# Patient Record
Sex: FEMALE | Race: WHITE | NOT HISPANIC OR LATINO | Employment: UNEMPLOYED | URBAN - METROPOLITAN AREA
[De-identification: names, ages, dates, MRNs, and addresses within clinical notes are randomized per-mention and may not be internally consistent; named-entity substitution may affect disease eponyms.]

---
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
## 2022-01-06 DIAGNOSIS — E07.9 THYROID DISORDER: Primary | ICD-10-CM

## 2022-01-07 LAB
T4 FREE SERPL-MCNC: 1.73 NG/DL (ref 0.93–1.6)
TSH SERPL DL<=0.005 MIU/L-ACNC: 0.09 UIU/ML (ref 0.45–4.5)

## 2022-01-12 LAB — THYROPEROXIDASE AB SERPL-ACNC: 31.6 IU/ML

## 2022-03-25 DIAGNOSIS — E07.9 THYROID DISORDER: ICD-10-CM

## 2022-03-25 RX ORDER — LEVOTHYROXINE SODIUM 0.12 MG/1
TABLET ORAL
Qty: 90 TABLET | Refills: 2 | Status: SHIPPED | OUTPATIENT
Start: 2022-03-25

## 2022-10-12 PROBLEM — J01.00 ACUTE NON-RECURRENT MAXILLARY SINUSITIS: Status: RESOLVED | Noted: 2021-07-22 | Resolved: 2022-10-12

## 2022-10-16 DIAGNOSIS — E07.9 THYROID DISORDER: ICD-10-CM

## 2022-10-17 RX ORDER — LEVOTHYROXINE SODIUM 0.12 MG/1
TABLET ORAL
Qty: 90 TABLET | Refills: 2 | Status: SHIPPED | OUTPATIENT
Start: 2022-10-17

## 2023-01-24 LAB
T4 FREE SERPL-MCNC: 1.77 NG/DL (ref 0.82–1.77)
TSH SERPL DL<=0.005 MIU/L-ACNC: 0.72 UIU/ML (ref 0.45–4.5)

## 2023-03-17 ENCOUNTER — OFFICE VISIT (OUTPATIENT)
Dept: INTERNAL MEDICINE CLINIC | Facility: CLINIC | Age: 23
End: 2023-03-17

## 2023-03-17 VITALS
WEIGHT: 152 LBS | HEIGHT: 62 IN | RESPIRATION RATE: 14 BRPM | DIASTOLIC BLOOD PRESSURE: 70 MMHG | HEART RATE: 74 BPM | BODY MASS INDEX: 27.97 KG/M2 | SYSTOLIC BLOOD PRESSURE: 130 MMHG | TEMPERATURE: 97.8 F | OXYGEN SATURATION: 98 %

## 2023-03-17 DIAGNOSIS — E03.9 ACQUIRED HYPOTHYROIDISM: Primary | ICD-10-CM

## 2023-03-17 DIAGNOSIS — Z13.6 SCREENING FOR CARDIOVASCULAR CONDITION: ICD-10-CM

## 2023-03-17 DIAGNOSIS — R10.10 PAIN OF UPPER ABDOMEN: ICD-10-CM

## 2023-03-17 RX ORDER — PANTOPRAZOLE SODIUM 40 MG/1
40 TABLET, DELAYED RELEASE ORAL
Qty: 30 TABLET | Refills: 5 | Status: SHIPPED | OUTPATIENT
Start: 2023-03-17 | End: 2023-09-13

## 2023-03-17 NOTE — PROGRESS NOTES
Assessment/Plan:    No problem-specific Assessment & Plan notes found for this encounter  Diagnoses and all orders for this visit:    Acquired hypothyroidism  -     TSH, 3rd generation; Future  -     T4, free; Future    Pain of upper abdomen  -     Lipase; Future  -     CBC and differential; Future  -     Comprehensive metabolic panel; Future  -     US abdomen complete; Future  -     pantoprazole (PROTONIX) 40 mg tablet; Take 1 tablet (40 mg total) by mouth daily before breakfast  -     H  pylori breath test; Future  -     Pregnancy Test (HCG Qualitative); Future  -     Celiac HLA-DQ,blood; Future    Screening for cardiovascular condition  -     Lipid panel; Future          Subjective:      Patient ID: Mariama Yoo is a 23 y o  female  abd pain 2 mo  B/l abd ache rlq  ruq  1 month  Daily   Eating stomache aches not feel full  All foods-daity and weat not noticed=more carbs  Meds=0  Stress=no more spring break  Bm=1 q 48 day-lifetime  Periods=monthly reg  No diarrhea  Fat in diet=less fat  Finishing jr year-ok  appt  roomates ok  Editinng=on line [employer] house=love books  otc pepcid-no help            The following portions of the patient's history were reviewed and updated as appropriate: allergies, current medications, past family history, past medical history, past social history, past surgical history, and problem list     Review of Systems   Constitutional: Negative for activity change and fatigue  HENT: Negative for ear discharge, ear pain, rhinorrhea and sore throat  Eyes: Negative for pain and visual disturbance  Respiratory: Negative for cough and shortness of breath  Cardiovascular: Negative for chest pain and leg swelling  Gastrointestinal: Positive for abdominal pain  Negative for abdominal distention, constipation and diarrhea  Endocrine: Negative for cold intolerance and polyuria  Genitourinary: Negative for flank pain and hematuria     Musculoskeletal: Negative for back pain and joint swelling  Skin: Negative for pallor and wound  Neurological: Negative for dizziness, seizures and speech difficulty  Psychiatric/Behavioral: Negative for confusion and hallucinations  Objective:      /70   Pulse 74   Temp 97 8 °F (36 6 °C)   Resp 14   Ht 5' 2" (1 575 m)   Wt 68 9 kg (152 lb)   SpO2 98%   BMI 27 80 kg/m²          Physical Exam  Vitals and nursing note reviewed  Constitutional:       General: She is not in acute distress  Appearance: Normal appearance  She is not ill-appearing  HENT:      Head: Normocephalic  Right Ear: External ear normal  There is no impacted cerumen  Left Ear: External ear normal  There is no impacted cerumen  Nose: No congestion or rhinorrhea  Mouth/Throat:      Pharynx: No posterior oropharyngeal erythema  Eyes:      General: No scleral icterus  Right eye: No discharge  Left eye: No discharge  Neck:      Vascular: No carotid bruit  Cardiovascular:      Rate and Rhythm: Normal rate and regular rhythm  Heart sounds: Normal heart sounds  No murmur heard  No friction rub  No gallop  Pulmonary:      Breath sounds: No wheezing or rhonchi  Abdominal:      General: There is no distension  Tenderness: There is no abdominal tenderness  There is no guarding  Musculoskeletal:         General: No swelling  Cervical back: No rigidity  Right lower leg: No edema  Left lower leg: No edema  Lymphadenopathy:      Cervical: No cervical adenopathy  Skin:     Coloration: Skin is not jaundiced  Neurological:      Mental Status: She is alert  Cranial Nerves: No cranial nerve deficit  Motor: No weakness        Coordination: Coordination normal    Psychiatric:         Mood and Affect: Mood normal

## 2023-03-22 LAB
ALBUMIN SERPL-MCNC: 4.5 G/DL (ref 3.9–5)
ALBUMIN/GLOB SERPL: 2 {RATIO} (ref 1.2–2.2)
ALP SERPL-CCNC: 78 IU/L (ref 42–106)
ALT SERPL-CCNC: 12 IU/L (ref 0–32)
ANNOTATION COMMENT IMP: NORMAL
AST SERPL-CCNC: 19 IU/L (ref 0–40)
B-HCG SERPL QL: NEGATIVE MIU/ML
BASOPHILS # BLD AUTO: 0.1 X10E3/UL (ref 0–0.2)
BASOPHILS NFR BLD AUTO: 1 %
BILIRUB SERPL-MCNC: <0.2 MG/DL (ref 0–1.2)
BUN SERPL-MCNC: 10 MG/DL (ref 6–20)
BUN/CREAT SERPL: 14 (ref 9–23)
CALCIUM SERPL-MCNC: 9.1 MG/DL (ref 8.7–10.2)
CHLORIDE SERPL-SCNC: 108 MMOL/L (ref 96–106)
CHOLEST SERPL-MCNC: 153 MG/DL (ref 100–199)
CO2 SERPL-SCNC: 20 MMOL/L (ref 20–29)
CREAT SERPL-MCNC: 0.73 MG/DL (ref 0.57–1)
EGFR: 121 ML/MIN/1.73
EOSINOPHIL # BLD AUTO: 0.5 X10E3/UL (ref 0–0.4)
EOSINOPHIL NFR BLD AUTO: 6 %
ERYTHROCYTE [DISTWIDTH] IN BLOOD BY AUTOMATED COUNT: 12.7 % (ref 11.7–15.4)
GLOBULIN SER-MCNC: 2.2 G/DL (ref 1.5–4.5)
GLUCOSE SERPL-MCNC: 83 MG/DL (ref 70–99)
HCT VFR BLD AUTO: 37.8 % (ref 34–46.6)
HDLC SERPL-MCNC: 65 MG/DL
HGB BLD-MCNC: 12.8 G/DL (ref 11.1–15.9)
HLA-DQ2 QL: NORMAL
HLA-DQ8 QL: NORMAL
IMM GRANULOCYTES # BLD: 0 X10E3/UL (ref 0–0.1)
IMM GRANULOCYTES NFR BLD: 0 %
LDLC SERPL CALC-MCNC: 75 MG/DL (ref 0–99)
LIPASE SERPL-CCNC: 33 U/L (ref 14–72)
LYMPHOCYTES # BLD AUTO: 1.5 X10E3/UL (ref 0.7–3.1)
LYMPHOCYTES NFR BLD AUTO: 17 %
MCH RBC QN AUTO: 29.8 PG (ref 26.6–33)
MCHC RBC AUTO-ENTMCNC: 33.9 G/DL (ref 31.5–35.7)
MCV RBC AUTO: 88 FL (ref 79–97)
MONOCYTES # BLD AUTO: 0.7 X10E3/UL (ref 0.1–0.9)
MONOCYTES NFR BLD AUTO: 8 %
NEUTROPHILS # BLD AUTO: 6 X10E3/UL (ref 1.4–7)
NEUTROPHILS NFR BLD AUTO: 68 %
PLATELET # BLD AUTO: 194 X10E3/UL (ref 150–450)
POTASSIUM SERPL-SCNC: 4.2 MMOL/L (ref 3.5–5.2)
PROT SERPL-MCNC: 6.7 G/DL (ref 6–8.5)
RBC # BLD AUTO: 4.3 X10E6/UL (ref 3.77–5.28)
REF LAB TEST METHOD: NORMAL
SL AMB VLDL CHOLESTEROL CALC: 13 MG/DL (ref 5–40)
SODIUM SERPL-SCNC: 145 MMOL/L (ref 134–144)
T4 FREE SERPL-MCNC: 1.75 NG/DL (ref 0.82–1.77)
TRIGL SERPL-MCNC: 62 MG/DL (ref 0–149)
TSH SERPL DL<=0.005 MIU/L-ACNC: 0.79 UIU/ML (ref 0.45–4.5)
UREA BREATH TEST QL: NEGATIVE
WBC # BLD AUTO: 8.7 X10E3/UL (ref 3.4–10.8)

## 2023-03-23 ENCOUNTER — HOSPITAL ENCOUNTER (OUTPATIENT)
Dept: ULTRASOUND IMAGING | Facility: MEDICAL CENTER | Age: 23
Discharge: HOME | End: 2023-03-23

## 2023-03-23 DIAGNOSIS — R10.10 PAIN OF UPPER ABDOMEN: ICD-10-CM

## 2023-03-31 LAB
ANNOTATION COMMENT IMP: NORMAL
HLA-DQ2 QL: POSITIVE
HLA-DQ8 QL: POSITIVE
REF LAB TEST METHOD: NORMAL

## 2023-04-08 DIAGNOSIS — R10.10 PAIN OF UPPER ABDOMEN: ICD-10-CM

## 2023-04-08 RX ORDER — PANTOPRAZOLE SODIUM 40 MG/1
TABLET, DELAYED RELEASE ORAL
Qty: 90 TABLET | Refills: 2 | Status: SHIPPED | OUTPATIENT
Start: 2023-04-08

## 2023-05-16 PROBLEM — Z13.6 SCREENING FOR CARDIOVASCULAR CONDITION: Status: RESOLVED | Noted: 2021-07-22 | Resolved: 2023-05-16

## 2023-05-19 ENCOUNTER — OFFICE VISIT (OUTPATIENT)
Dept: OBGYN CLINIC | Facility: CLINIC | Age: 23
End: 2023-05-19

## 2023-05-19 VITALS
DIASTOLIC BLOOD PRESSURE: 72 MMHG | SYSTOLIC BLOOD PRESSURE: 108 MMHG | WEIGHT: 152 LBS | HEIGHT: 62 IN | BODY MASS INDEX: 27.97 KG/M2

## 2023-05-19 DIAGNOSIS — Z30.09 CONTRACEPTIVE EDUCATION: ICD-10-CM

## 2023-05-19 DIAGNOSIS — Z01.419 ENCOUNTER FOR GYNECOLOGICAL EXAMINATION WITHOUT ABNORMAL FINDING: Primary | ICD-10-CM

## 2023-05-19 DIAGNOSIS — Z12.4 SCREENING FOR CERVICAL CANCER: ICD-10-CM

## 2023-05-19 DIAGNOSIS — N76.0 BACTERIAL VAGINOSIS: ICD-10-CM

## 2023-05-19 DIAGNOSIS — K90.0 CELIAC DISEASE: ICD-10-CM

## 2023-05-19 DIAGNOSIS — Z11.3 SCREEN FOR STD (SEXUALLY TRANSMITTED DISEASE): ICD-10-CM

## 2023-05-19 DIAGNOSIS — E03.9 HYPOTHYROIDISM, UNSPECIFIED TYPE: ICD-10-CM

## 2023-05-19 DIAGNOSIS — B96.89 BACTERIAL VAGINOSIS: ICD-10-CM

## 2023-05-19 RX ORDER — METRONIDAZOLE 7.5 MG/G
1 GEL VAGINAL DAILY
Qty: 70 G | Refills: 1 | Status: SHIPPED | OUTPATIENT
Start: 2023-05-19 | End: 2023-05-26

## 2023-05-19 NOTE — PATIENT INSTRUCTIONS
Pap every 3 years if normal, STI testing as indicated, exercise most days of week, obtain appropriate diet and hydration, Calcium 1000mg + 600 vit D daily, birth control as directed (ACHES reviewed)  Benefits, risks and alternatives discussed/reviewed  Condom use when sexually active for sexually transmitted infection prevention  HPV 9 vaccine recommended through age 39  Check with your insurance for coverage  If covered, call office to schedule start of vaccine series  Annual mammogram starting at age 36, monthly breast self exam  [facility]   at red light or at least  20 times a day  As applies   Risks and benefits of  NISHA, POP, Nuvaring , Depo  contraceptive  patch and  LARCS  DW pt  Condoms to  prevent  STDs  Pt will call if needs  contraception

## 2023-05-24 LAB
C TRACH RRNA CVX QL NAA+PROBE: NEGATIVE
CYTOLOGIST CVX/VAG CYTO: NORMAL
DX ICD CODE: NORMAL
N GONORRHOEA RRNA CVX QL NAA+PROBE: NEGATIVE
OTHER STN SPEC: NORMAL
OTHER STN SPEC: NORMAL
PATH REPORT.FINAL DX SPEC: NORMAL
SL AMB NOTE:: NORMAL
SL AMB SPECIMEN ADEQUACY: NORMAL
SL AMB TEST METHODOLOGY: NORMAL

## 2023-06-02 NOTE — PROGRESS NOTES
[address], [address], [facility], [address] [place] [address]    ASSESSMENT/PLAN: Jinx Cranker is a 26 y o  Benito Thomas who presents for annual gynecologic exam     Encounter for routine gynecologic examination  - Routine well woman exam completed today  - HPV Vaccination status: Immunization series complete  - STI screening offered including HIV testing: cultures    - Contraceptive counseling discussed  Current contraception: condoms or combination OCPs:     Additional problems addressed during this visit:  1  Encounter for gynecological examination without abnormal finding    2  Screen for STD (sexually transmitted disease)    3  Celiac disease    4  Contraceptive education    5  Hypothyroidism, unspecified type        CC:  Annual Gynecologic Examination    HPI: Jinx Cranker is a 26 y o  Benito Thomas who presents for annual gynecologic examination  25 yo  here for wellness exam    -28  D for 5 d  minimla cramps  Not currently sexually active  + condom use   + hx of BV treated at school  Hx reviewed and updated    The following portions of the patient's history were reviewed and updated as appropriate: She  has a past medical history of Depression (2016), Dermagraphy, and Hypothyroidism ()  She  has a past surgical history that includes Anterior cruciate ligament repair () and [place] tooth extraction ()  Her family history includes Asthma in her brother; Breast cancer in her paternal grandmother; Cancer in her father and paternal grandfather; Heart disease in her paternal grandmother; Migraines in her maternal grandmother; Thyroid disease in her maternal grandmother and mother  She  reports that she has never smoked  She has never used smokeless tobacco  She reports current alcohol use of about 4 0 standard drinks per week  She reports that she does not use drugs    Current Outpatient Medications   Medication Sig Dispense Refill   • levothyroxine 125 mcg tablet TAKE I have reviewed the notes, assessments, and/or procedures performed by Ijeoma LEON  , I concur with her/his documentation of Xena Caicedo.   "1 TABLET BY MOUTH EVERY DAY IN THE EARLY MORNING 90 tablet 2     No current facility-administered medications for this visit  She is allergic to gluten meal - food allergy       Review of Systems   Constitutional: Negative for chills and fever  HENT: Negative for ear pain and sore throat  Eyes: Negative for pain and visual disturbance  Respiratory: Negative for cough and shortness of breath  Cardiovascular: Negative for chest pain and palpitations  Gastrointestinal: Negative for abdominal pain and vomiting  Genitourinary: Negative for dysuria and hematuria  Musculoskeletal: Negative for arthralgias and back pain  Skin: Negative for color change and rash  Neurological: Negative for seizures and syncope  Hematological: Negative  Psychiatric/Behavioral: Negative  All other systems reviewed and are negative  Objective:  /72 (BP Location: Left arm, Patient Position: Sitting, Cuff Size: Standard)   Ht 5' 2\" (1 575 m)   Wt 68 9 kg (152 lb)   LMP 05/10/2023 (Exact Date)   Breastfeeding No   BMI 27 80 kg/m²    Physical Exam  Vitals and nursing note reviewed  Constitutional:       Appearance: Normal appearance  HENT:      Head: Normocephalic  Cardiovascular:      Rate and Rhythm: Normal rate and regular rhythm  Pulses: Normal pulses  Heart sounds: Normal heart sounds  Pulmonary:      Effort: Pulmonary effort is normal       Breath sounds: Normal breath sounds  Chest:      Chest wall: No mass, lacerations, swelling, tenderness or edema  Breasts: Chas Score is 4  Breasts are symmetrical       Right: Normal  No swelling, bleeding, inverted nipple, mass, nipple discharge, skin change or tenderness  Left: No swelling, bleeding, inverted nipple, mass, nipple discharge, skin change or tenderness  Abdominal:      General: Abdomen is flat  Bowel sounds are normal       Palpations: Abdomen is soft  Genitourinary:     General: Normal vulva        " Exam position: Lithotomy position  Pubic Area: No rash  Chas stage (genital): 4       Labia:         Right: No rash, tenderness or lesion  Left: No rash, tenderness or lesion  Urethra: No urethral pain, urethral swelling or urethral lesion  Vagina: Normal       Cervix: No cervical motion tenderness or discharge  Uterus: Normal        Adnexa: Right adnexa normal and left adnexa normal       Rectum: Normal    Musculoskeletal:         General: Normal range of motion  Cervical back: Neck supple  Lymphadenopathy:      Upper Body:      Right upper body: No supraclavicular, axillary or pectoral adenopathy  Left upper body: No supraclavicular, axillary or pectoral adenopathy  Lower Body: No right inguinal adenopathy  No left inguinal adenopathy  Skin:     General: Skin is warm and dry  Neurological:      General: No focal deficit present  Mental Status: She is alert and oriented to person, place, and time  Psychiatric:         Mood and Affect: Mood normal          Behavior: Behavior normal          Thought Content:  Thought content normal          Judgment: Judgment normal

## 2023-07-18 PROBLEM — Z11.3 SCREEN FOR STD (SEXUALLY TRANSMITTED DISEASE): Status: RESOLVED | Noted: 2023-05-19 | Resolved: 2023-07-18

## 2023-10-24 ENCOUNTER — OFFICE VISIT (OUTPATIENT)
Dept: OBGYN CLINIC | Facility: CLINIC | Age: 23
End: 2023-10-24
Payer: COMMERCIAL

## 2023-10-24 VITALS
BODY MASS INDEX: 27.79 KG/M2 | SYSTOLIC BLOOD PRESSURE: 110 MMHG | DIASTOLIC BLOOD PRESSURE: 58 MMHG | HEIGHT: 62 IN | WEIGHT: 151 LBS

## 2023-10-24 DIAGNOSIS — Z30.09 CONTRACEPTIVE EDUCATION: Primary | ICD-10-CM

## 2023-10-24 DIAGNOSIS — Z78.9 USES VAGINAL CONTRACEPTIVE RING: ICD-10-CM

## 2023-10-24 PROCEDURE — 99203 OFFICE O/P NEW LOW 30 MIN: CPT | Performed by: OBSTETRICS & GYNECOLOGY

## 2023-10-24 RX ORDER — ETONOGESTREL AND ETHINYL ESTRADIOL VAGINAL .015; .12 MG/D; MG/D
RING VAGINAL
Qty: 3 EACH | Refills: 4 | Status: SHIPPED | OUTPATIENT
Start: 2023-10-24

## 2023-10-24 NOTE — PROGRESS NOTES
PROBLEM GYNECOLOGICAL VISIT    Shin Cevallos is a 26 y.o. female who presents today with complaint of need for  family planning . Her general medical history has been reviewed and she reports it as follows:    Past Medical History:   Diagnosis Date    Depression 2016    Dermagraphy     Hypothyroidism      Past Surgical History:   Procedure Laterality Date    ANTERIOR CRUCIATE LIGAMENT REPAIR      and 2019    WISDOM TOOTH EXTRACTION  2018     OB History          0    Para   0    Term   0       0    AB   0    Living   0         SAB   0    IAB   0    Ectopic   0    Multiple   0    Live Births   0               Social History     Tobacco Use    Smoking status: Never    Smokeless tobacco: Never   Vaping Use    Vaping Use: Never used   Substance Use Topics    Alcohol use: Yes     Alcohol/week: 4.0 standard drinks of alcohol     Types: 4 Glasses of wine per week     Comment: socially    Drug use: Never       Current Outpatient Medications   Medication Instructions    etonogestrel-ethinyl estradiol (NuvaRing) 0.12-0.015 MG/24HR vaginal ring Insert vaginally and leave in place for 3 consecutive weeks, then remove for 1 week. levothyroxine 125 mcg tablet TAKE 1 TABLET BY MOUTH EVERY DAY IN THE EARLY MORNING       History of Present Illness:   + sexually active. Encouraged condom use.   + hx of  heavier menses . Roommate uses the nuvaring. Review of Systems:  Review of Systems   Constitutional: Negative. Genitourinary:  Negative for difficulty urinating, vaginal bleeding and vaginal discharge. Neurological: Negative. Hematological: Negative. Psychiatric/Behavioral: Negative. Physical Exam:  /58 (BP Location: Left arm, Patient Position: Sitting, Cuff Size: Standard)   Ht 5' 2" (1.575 m)   Wt 68.5 kg (151 lb)   LMP 10/14/2023 (Exact Date)   BMI 27.62 kg/m²   Physical Exam  Constitutional:       Appearance: She is normal weight.    Neurological:      General: No focal deficit present. Mental Status: She is alert and oriented to person, place, and time. Psychiatric:         Mood and Affect: Mood normal.         Behavior: Behavior normal.         Thought Content: Thought content normal.         Judgment: Judgment normal.   Vitals and nursing note reviewed. -urinalysis: na    Assessment:   1. Contraceptive education ,   sti prevention    hx of depression      Plan:   - Through a patient centered approach to contraceptive counseling, we discussed a variety of contraceptive methods including pill, patch, ring, injectable, long-acting reversible methods such as the subdermal contraceptive implant and intrauterine device, and sterilization. We compared the efficacy of various methods using a tiered efficacy chart. We discussed the expected changes on menstrual bleeding and other side effects of various methods. - We assessed for medical conditions that could affect the safety profile of contraception. She does not smoke, denies a history of hypertension or VTE, and denies a history of migraine with aura. - The patient opts to proceed with [facility] place the Sunday after  menses starts  Reviewed ACHES and BTB  Nuvaring  one  per vagina  for 3 weeks in one week out disp  3  4 refills  fu in  May 2024. I have spent a total time of 30 minutes on 10/24/23 in caring for this patient including Diagnostic results, Risks and benefits of tx options, Instructions for management, Patient and family education, Importance of tx compliance, Risk factor reductions, Counseling / Coordination of care, Documenting in the medical record, and Reviewing / ordering tests, medicine, procedures  . Reviewed with patient that [facility], but that they will not necessarily be reviewed by me immediately. Explained that I will review results at my earliest opportunity and contact patient appropriately.

## 2023-11-11 DIAGNOSIS — E07.9 THYROID DISORDER: ICD-10-CM

## 2023-11-11 RX ORDER — LEVOTHYROXINE SODIUM 0.12 MG/1
125 TABLET ORAL EVERY MORNING
Qty: 90 TABLET | Refills: 2 | Status: SHIPPED | OUTPATIENT
Start: 2023-11-11

## 2024-02-21 PROBLEM — Z01.419 ENCOUNTER FOR GYNECOLOGICAL EXAMINATION WITHOUT ABNORMAL FINDING: Status: RESOLVED | Noted: 2023-05-19 | Resolved: 2024-02-21

## 2024-03-12 ENCOUNTER — NURSE TRIAGE (OUTPATIENT)
Age: 24
End: 2024-03-12

## 2024-03-12 NOTE — TELEPHONE ENCOUNTER
"Pt reports vaginal burning after intercourse one week ago. Used Monistat 1 day with no relief.  Went to  on Sunday morning and was told she had a yeast infection. Was prescribed diflucan, took first dose on Sunday. Taking second dose tomorrow. Still has thick white discharge. Denies itching, burning, odor or abdominal pain. Pt aware to call back if second dose of diflucan does not help or if symptoms worsen.    Reason for Disposition   Symptoms of a vaginal yeast infection (i.e., white, thick, cottage-cheese-like, itchy, not bad smelling discharge)    Answer Assessment - Initial Assessment Questions  1. DISCHARGE: \"Describe the discharge.\" (e.g., white, yellow, green, gray, foamy, cottage cheese-like)      Thick white discharge  2. ODOR: \"Is there a bad odor?\"      deneis  3. ONSET: \"When did the discharge begin?\"      One week ago  4. RASH: \"Is there a rash in that area?\" If Yes, ask: \"Describe it.\" (e.g., redness, blisters, sores, bumps)      denies  5. ABDOMINAL PAIN: \"Are you having any abdominal pain?\" If Yes, ask: \"What does it feel like? \" (e.g., crampy, dull, intermittent, constant)       denies  6. ABDOMINAL PAIN SEVERITY: If present, ask: \"How bad is it?\"  (e.g., mild, moderate, severe)   - MILD - doesn't interfere with normal activities    - MODERATE - interferes with normal activities or awakens from sleep    - SEVERE - patient doesn't want to move (R/O peritonitis)       denies  7. CAUSE: \"What do you think is causing the discharge?\" \"Have you had the same problem before? What happened then?\"      yeast  8. OTHER SYMPTOMS: \"Do you have any other symptoms?\" (e.g., fever, itching, vaginal bleeding, pain with urination, injury to genital area, vaginal foreign body)      denies  9. PREGNANCY: \"Is there any chance you are pregnant?\" \"When was your last menstrual period?\"      Denies.    Protocols used: Vaginal Discharge-ADULT-[place]    "

## 2024-03-21 ENCOUNTER — OFFICE VISIT (OUTPATIENT)
Dept: OBGYN CLINIC | Facility: CLINIC | Age: 24
End: 2024-03-21
Payer: COMMERCIAL

## 2024-03-21 ENCOUNTER — NURSE TRIAGE (OUTPATIENT)
Age: 24
End: 2024-03-21

## 2024-03-21 VITALS
WEIGHT: 140 LBS | SYSTOLIC BLOOD PRESSURE: 114 MMHG | BODY MASS INDEX: 25.76 KG/M2 | HEIGHT: 62 IN | DIASTOLIC BLOOD PRESSURE: 70 MMHG

## 2024-03-21 DIAGNOSIS — N76.0 BACTERIAL VAGINOSIS: Primary | ICD-10-CM

## 2024-03-21 DIAGNOSIS — B96.89 BACTERIAL VAGINOSIS: Primary | ICD-10-CM

## 2024-03-21 DIAGNOSIS — N89.8 VAGINAL DISCHARGE: ICD-10-CM

## 2024-03-21 DIAGNOSIS — Z11.3 ROUTINE SCREENING FOR STI (SEXUALLY TRANSMITTED INFECTION): ICD-10-CM

## 2024-03-21 DIAGNOSIS — Z11.3 SCREENING EXAMINATION FOR VENEREAL DISEASE: ICD-10-CM

## 2024-03-21 LAB
CLUE CELLS SPEC QL WET PREP: ABNORMAL
PH SMN: 4.5 [PH]
SL AMB POCT WET MOUNT: ABNORMAL
T VAGINALIS VAG QL WET PREP: ABNORMAL
YEAST VAG QL WET PREP: ABNORMAL

## 2024-03-21 PROCEDURE — 99213 OFFICE O/P EST LOW 20 MIN: CPT | Performed by: PHYSICIAN ASSISTANT

## 2024-03-21 PROCEDURE — 87210 SMEAR WET MOUNT SALINE/INK: CPT | Performed by: PHYSICIAN ASSISTANT

## 2024-03-21 RX ORDER — METRONIDAZOLE 500 MG/1
500 TABLET ORAL EVERY 12 HOURS SCHEDULED
Qty: 14 TABLET | Refills: 0 | Status: SHIPPED | OUTPATIENT
Start: 2024-03-21 | End: 2024-03-28

## 2024-03-21 NOTE — PATIENT INSTRUCTIONS
For prevention: Recommend acidophilus or yogurt daily, avoid irritating soaps or lotions, no tight fitted pants or underwear, avoid bubble baths, do not stay in wet swimsuit.

## 2024-03-21 NOTE — TELEPHONE ENCOUNTER
"Patient had yeast infection symptoms about 2 weeks ago and she tried monistat-1 and also went to the urgent care and they prescribed her fluconazole.  She took 1 tablet and took another tablet 3 days later which was last Wednesday 3/13/24.  She is still having vaginal white and thick discharge and is requesting an appointment.      Scheduled an appointment for today.  Patient voiced appreciation.      Errol texted provider to make aware of patient's arrival.      Reason for Disposition   Symptoms of a yeast infection (i.e., itchy, white discharge, not bad smelling) and not improved > 3 days following CARE ADVICE    Answer Assessment - Initial Assessment Questions  1. SYMPTOM: \"What's the main symptom you're concerned about?\" (e.g., pain, itching, dryness)      Vaginal discharge   2. LOCATION: \"Where is the  discharge located?\" (e.g., inside/outside, left/right)      Inside and outside   3. ONSET: \"When did the  discharge  start?\"      2 weeks ago   4. PAIN: \"Is there any pain?\" If Yes, ask: \"How bad is it?\" (Scale: 1-10; mild, moderate, severe)      Denies   5. ITCHING: \"Is there any itching?\" If Yes, ask: \"How bad is it?\" (Scale: 1-10; mild, moderate, severe)      Mild   6. CAUSE: \"What do you think is causing the discharge?\" \"Have you had the same problem before? What happened then?\"      Yeast infection   7. OTHER SYMPTOMS: \"Do you have any other symptoms?\" (e.g., fever, itching, vaginal bleeding, pain with urination, injury to genital area, vaginal foreign body)      Denies   8. PREGNANCY: \"Is there any chance you are pregnant?\" \"When was your last menstrual period?\"      Denies    Protocols used: Vaginal Symptoms-ADULT-[place]    "

## 2024-03-21 NOTE — ASSESSMENT & PLAN NOTE
Reviewed BV on wet mount.   Will plan to treat with Metronidazole 500mg BID x 7 days.   STD cultures done.   For prevention: Recommend acidophilus or yogurt daily, avoid irritating soaps or lotions, no tight fitted pants or underwear, avoid bubble baths, do not stay in wet swimsuit.  Return to office if symptoms are not improving, worsening, or returning.

## 2024-03-21 NOTE — PROGRESS NOTES
Assessment/Plan:    Bacterial vaginosis  Reviewed BV on wet mount.   Will plan to treat with Metronidazole 500mg BID x 7 days.   STD cultures done.   For prevention: Recommend acidophilus or yogurt daily, avoid irritating soaps or lotions, no tight fitted pants or underwear, avoid bubble baths, do not stay in wet swimsuit.  Return to office if symptoms are not improving, worsening, or returning.          Problem List Items Addressed This Visit          Genitourinary    Bacterial vaginosis     Reviewed BV on wet mount.   Will plan to treat with Metronidazole 500mg BID x 7 days.   STD cultures done.   For prevention: Recommend acidophilus or yogurt daily, avoid irritating soaps or lotions, no tight fitted pants or underwear, avoid bubble baths, do not stay in wet swimsuit.  Return to office if symptoms are not improving, worsening, or returning.          Relevant Medications    metroNIDAZOLE (FLAGYL) 500 mg tablet     Other Visit Diagnoses       Screening examination for venereal disease    -  Primary    Routine screening for STI (sexually transmitted infection)        Relevant Orders    Chlamydia/GC DACIA, Confirmation    Vaginal discharge        Relevant Orders    POCT wet mount (Completed)              Subjective:      Patient ID: Sultana Dolan is a 23 y.o. female.    HPI  24 yo seen for vaginal discharge. A couple of days ago thick and white. Now more liquid. Mild itching. Denies odor.   Recent STD cultures negative. Recently found out boyfriend may have been unfaithful. Would like retesting today.   Denies bowel or bladder issues, pelvic pain. Had fever Thursday 3/7/2024, nothing since.   Urgent care on 3/10/2024, had intercourse on 9th and had a lot of burning. Discharge and itching at that time as well. Was treated with Diflucan 150mg x 2 doses 3 days apart. Chlamydia, gonorrhea, trich negative. BV, Yeast culture negative. Reports had menses 3/14/2024.   The following portions of the patient's history were  reviewed and updated as appropriate: She  has a past medical history of Depression (2016), Dermagraphy, and Hypothyroidism (2015).  She   Patient Active Problem List    Diagnosis Date Noted    Uses vaginal contraceptive ring 10/24/2023    Contraceptive education 05/19/2023    Celiac disease 05/19/2023    Hypothyroidism 05/19/2023    Bacterial vaginosis 05/19/2023    Pain of upper abdomen 03/17/2023    Bipolar 1 disorder (HCC) 07/22/2021    S/P ACL reconstruction 12/17/2019    Constipation, acute 12/15/2017    Depression 12/15/2017    Thyroid disorder 12/31/2016    BMI (body mass index), pediatric, 85% to less than 95% for age 10/20/2016    Myopia of both eyes 08/30/2016    Acquired hypothyroidism 07/17/2016    Enlarged pituitary gland (HCC) 03/28/2014     She  has a past surgical history that includes Anterior cruciate ligament repair (2015) and [place] tooth extraction (2018).  Her family history includes Asthma in her brother; Breast cancer in her paternal grandmother; Cancer in her father and paternal grandfather; Heart disease in her paternal grandmother; Migraines in her maternal grandmother; Thyroid disease in her maternal grandmother and mother.  She  reports that she has never smoked. She has never used smokeless tobacco. She reports current alcohol use of about 4.0 standard drinks of alcohol per week. She reports that she does not use drugs.  Current Outpatient Medications   Medication Sig Dispense Refill    etonogestrel-ethinyl estradiol (NuvaRing) 0.12-0.015 MG/24HR vaginal ring Insert vaginally and leave in place for 3 consecutive weeks, then remove for 1 week. 3 each 4    levothyroxine 125 mcg tablet TAKE 1 TABLET BY MOUTH EVERY DAY IN THE MORNING 90 tablet 2    metroNIDAZOLE (FLAGYL) 500 mg tablet Take 1 tablet (500 mg total) by mouth every 12 (twelve) hours for 7 days 14 tablet 0     No current facility-administered medications for this visit.     She is allergic to gluten meal - food  "allergy..    Review of Systems   Constitutional:  Negative for fatigue, fever and unexpected weight change.   HENT:  Negative for dental problem and sinus pressure.    Eyes:  Negative for visual disturbance.   Respiratory:  Negative for cough, shortness of breath and wheezing.    Cardiovascular:  Negative for chest pain.   Gastrointestinal:  Negative for abdominal pain, blood in stool, constipation, diarrhea, nausea and vomiting.   Endocrine: Negative for polydipsia.   Genitourinary:  Positive for vaginal discharge. Negative for difficulty urinating, dyspareunia, dysuria, frequency, hematuria, pelvic pain and urgency.   Musculoskeletal:  Negative for arthralgias and back pain.   Neurological:  Negative for dizziness, seizures, light-headedness and headaches.   Psychiatric/Behavioral:  Negative for suicidal ideas. The patient is not nervous/anxious.          Objective:      /70 (BP Location: Right arm, Patient Position: Sitting, Cuff Size: Standard)   Ht 5' 2\" (1.575 m)   Wt 63.5 kg (140 lb)   LMP 03/14/2024   BMI 25.61 kg/m²          Physical Exam  Constitutional:       Appearance: Normal appearance. She is well-developed.   Neck:      Thyroid: No thyroid mass or thyromegaly.   Cardiovascular:      Rate and Rhythm: Normal rate and regular rhythm.      Heart sounds: Normal heart sounds. No murmur heard.     No friction rub. No gallop.   Pulmonary:      Effort: Pulmonary effort is normal. No respiratory distress.      Breath sounds: Normal breath sounds. No wheezing or rales.   Abdominal:      General: Bowel sounds are normal. There is no distension.      Palpations: Abdomen is soft. There is no mass.      Tenderness: There is no abdominal tenderness. There is no guarding or rebound.   Genitourinary:     Labia:         Right: No rash, tenderness, lesion or injury.         Left: No rash, tenderness, lesion or injury.       Urethra: No prolapse, urethral pain, urethral swelling or urethral lesion.      Vagina: " No signs of injury. Vaginal discharge (thin white discharge.) present. No erythema, tenderness or bleeding.      Cervix: No cervical motion tenderness, discharge or friability.      Uterus: Not deviated, not enlarged and not tender.       Adnexa:         Right: No mass, tenderness or fullness.          Left: No mass, tenderness or fullness.     Musculoskeletal:      Cervical back: Neck supple.   Lymphadenopathy:      Cervical: No cervical adenopathy.      Upper Body:      Right upper body: No supraclavicular adenopathy.      Left upper body: No supraclavicular adenopathy.   Skin:     General: Skin is warm and dry.      Coloration: Skin is not pale.      Findings: No erythema or rash.   Neurological:      Mental Status: She is alert and oriented to person, place, and time.   Psychiatric:         Behavior: Behavior normal.         Thought Content: Thought content normal.         Judgment: Judgment normal.           Wet mount: ph 4.5. Clue cells throughout. No trichomonads or yeast.

## 2024-03-26 LAB
C TRACH RRNA SPEC QL NAA+PROBE: NEGATIVE
N GONORRHOEA RRNA SPEC QL NAA+PROBE: NEGATIVE

## 2024-06-24 ENCOUNTER — OFFICE VISIT (OUTPATIENT)
Dept: OBGYN CLINIC | Facility: CLINIC | Age: 24
End: 2024-06-24
Payer: COMMERCIAL

## 2024-06-24 ENCOUNTER — NURSE TRIAGE (OUTPATIENT)
Age: 24
End: 2024-06-24

## 2024-06-24 VITALS
BODY MASS INDEX: 26.5 KG/M2 | SYSTOLIC BLOOD PRESSURE: 104 MMHG | HEIGHT: 62 IN | DIASTOLIC BLOOD PRESSURE: 64 MMHG | WEIGHT: 144 LBS

## 2024-06-24 DIAGNOSIS — N76.1 SUBACUTE VAGINITIS: Primary | ICD-10-CM

## 2024-06-24 DIAGNOSIS — Z11.3 SCREEN FOR STD (SEXUALLY TRANSMITTED DISEASE): ICD-10-CM

## 2024-06-24 DIAGNOSIS — Z78.9 USES VAGINAL CONTRACEPTIVE RING: ICD-10-CM

## 2024-06-24 LAB
BV WHIFF TEST VAG QL: ABNORMAL
CLUE CELLS SPEC QL WET PREP: ABNORMAL
PH SMN: 4 [PH]
SL AMB POCT WET MOUNT: ABNORMAL
T VAGINALIS VAG QL WET PREP: ABNORMAL
YEAST VAG QL WET PREP: ABNORMAL

## 2024-06-24 PROCEDURE — 87210 SMEAR WET MOUNT SALINE/INK: CPT | Performed by: OBSTETRICS & GYNECOLOGY

## 2024-06-24 PROCEDURE — 99213 OFFICE O/P EST LOW 20 MIN: CPT | Performed by: OBSTETRICS & GYNECOLOGY

## 2024-06-24 RX ORDER — FLUCONAZOLE 150 MG/1
150 TABLET ORAL DAILY
Qty: 1 TABLET | Refills: 1 | Status: SHIPPED | OUTPATIENT
Start: 2024-06-24 | End: 2024-06-25

## 2024-06-24 NOTE — TELEPHONE ENCOUNTER
"Patient is calling in, she reports starting with vaginal symptoms yesterday. She is having a brown discharge that is foul smelling, itching/irritation and burning. She reports she took at her vaginal ring yesterday and she had intense burning. Appt made for today, unable to determine if BV or yeast      Reason for Disposition   Bad smelling vaginal discharge    Answer Assessment - Initial Assessment Questions  1. DISCHARGE: \"Describe the discharge.\" (e.g., white, yellow, green, gray, foamy, cottage cheese-like)      Brown spotting- on ring  2. ODOR: \"Is there a bad odor?\"      Yes   3. ONSET: \"When did the discharge begin?\"      Yesterday   4. RASH: \"Is there a rash in that area?\" If Yes, ask: \"Describe it.\" (e.g., redness, blisters, sores, bumps)      No   5. ABDOMINAL PAIN: \"Are you having any abdominal pain?\" If Yes, ask: \"What does it feel like? \" (e.g., crampy, dull, intermittent, constant)       Denies   6. ABDOMINAL PAIN SEVERITY: If present, ask: \"How bad is it?\"  (e.g., mild, moderate, severe)   - MILD - doesn't interfere with normal activities    - MODERATE - interferes with normal activities or awakens from sleep    - SEVERE - patient doesn't want to move (R/O peritonitis)       Denies   8. OTHER SYMPTOMS: \"Do you have any other symptoms?\" (e.g., fever, itching, vaginal bleeding, pain with urination, injury to genital area, vaginal foreign body)      Itching and burning- burning when took vaginal ring out yesterday    Protocols used: Vaginal Discharge-ADULT-[place]    "

## 2024-06-24 NOTE — PROGRESS NOTES
PROBLEM GYNECOLOGICAL VISIT    Sultana Dolan is a 24 y.o. female who presents today with complaint of vaginal itching and dc off   Her general medical history has been reviewed and she reports it as follows:    Past Medical History:   Diagnosis Date   • Depression    • Dermagraphy    • Hypothyroidism      Past Surgical History:   Procedure Laterality Date   • ANTERIOR CRUCIATE LIGAMENT REPAIR      and 2019   • WISDOM TOOTH EXTRACTION  2018     OB History        0    Para   0    Term   0       0    AB   0    Living   0       SAB   0    IAB   0    Ectopic   0    Multiple   0    Live Births   0               Social History     Tobacco Use   • Smoking status: Never     Passive exposure: Never   • Smokeless tobacco: Never   Vaping Use   • Vaping status: Never Used   Substance Use Topics   • Alcohol use: Yes     Alcohol/week: 4.0 standard drinks of alcohol     Types: 4 Glasses of wine per week     Comment: socially   • Drug use: Never       Current Outpatient Medications   Medication Instructions   • etonogestrel-ethinyl estradiol (NuvaRing) 0.12-0.015 MG/24HR vaginal ring Insert vaginally and leave in place for 3 consecutive weeks, then remove for 1 week.   • fluconazole (DIFLUCAN) 150 mg, Oral, Daily   • levothyroxine 125 mcg, Oral, Every morning   Wilol do one dose of  Diflucan  150 mg  po.  Open to air  fu prn.  Pt day one of menses      History of Present Illness:   + vaginal itching and  burning for the past  2 d.  +  same partner  Nuvaring  for  family planning .   Removed yesterday.   Nodysuria or frequency.     Review of Systems:  Review of Systems   Constitutional: Negative.    Genitourinary:  Positive for vaginal bleeding and vaginal discharge. Negative for decreased urine volume, difficulty urinating, dyspareunia, dysuria, frequency, genital sores, hematuria, menstrual problem, pelvic pain and urgency.   Musculoskeletal: Negative.    Neurological: Negative.   "  Psychiatric/Behavioral: Negative.         Physical Exam:  /64 (BP Location: Left arm, Patient Position: Sitting, Cuff Size: Standard)   Ht 5' 2\" (1.575 m)   Wt 65.3 kg (144 lb)   LMP 05/27/2024   BMI 26.34 kg/m²   Physical Exam  Constitutional:       Appearance: Normal appearance.   Genitourinary:      Vulva, bladder, rectum and urethral meatus normal.      Right Labia: No rash, tenderness, lesions or skin changes.     Left Labia: No tenderness, lesions, skin changes or rash.     No inguinal adenopathy present in the right side.     Pelvic Chas Score: 4/5.     Vaginal discharge and bleeding present.      No vaginal erythema, tenderness, ulceration, granulation tissue or cuff induration.      No vaginal prolapse present.     No vaginal atrophy present.       Right Adnexa: not tender and no mass present.     Left Adnexa: not tender and no mass present.     Cervix is not parous.      No cervical motion tenderness or friability.      No parametrium nodularity or thickening present.     Uterus is not enlarged, tender or irregular.      No urethral prolapse, tenderness, mass or discharge present.      Pelvic Floor: Levator muscle strength is 5/5.     Pelvic floor neuro is intact.     Pelvic exam was performed with patient in the lithotomy position.   Abdominal:      General: Abdomen is flat.      Palpations: Abdomen is soft.      Hernia: There is no hernia in the left inguinal area or right inguinal area.   Musculoskeletal:         General: Normal range of motion.   Lymphadenopathy:      Lower Body: No right inguinal adenopathy.   Neurological:      Mental Status: She is alert and oriented to person, place, and time.   Skin:     General: Skin is warm and dry.   Psychiatric:         Mood and Affect: Mood normal.         Behavior: Behavior normal.         Thought Content: Thought content normal.         Judgment: Judgment normal.   Vitals reviewed.         Assessment:   1. Vaginitis      Plan:   \"Wet mount " done,  gc chlam done .  Open to air  no under wear to bed at night.   Do one dose of diflucan today condoms     Reviewed with patient [facility], but that they will not necessarily be reviewed by me immediately.  Explained that I will review results at my earliest opportunity and contact patient appropriately.

## 2024-06-24 NOTE — TELEPHONE ENCOUNTER
Regarding: pt has yeast infection or bv  ----- Message from Eileen HESTER sent at 6/24/2024  8:13 AM EDT -----  Patient called and has yeast infection or bv and symptoms started yesterday.  Please call her back at [phone]. Thank you

## 2024-06-27 LAB
C TRACH RRNA SPEC QL NAA+PROBE: NEGATIVE
N GONORRHOEA RRNA SPEC QL NAA+PROBE: NEGATIVE

## 2024-07-15 ENCOUNTER — NURSE TRIAGE (OUTPATIENT)
Age: 24
End: 2024-07-15

## 2024-07-15 NOTE — TELEPHONE ENCOUNTER
"Spoke with patient who reports a few days of vulvar itching, irritation and burning when urinating, but describes it as the external skin burning, but UTI like.  She denies any vaginal discharge or rashes, and reports this feels different than previous yeast infection.  Appointment available for today.  No further questions or concerns at this time.    Reason for Disposition   ALL other vulvar symptoms (Exception: feels like prior yeast infection, or rash < 24 hour duration)    Answer Assessment - Initial Assessment Questions  1. SYMPTOM: \"What's the main symptom you're concerned about?\" (e.g., rash, itching, swelling, dryness)     Vulvar itching, burning, redness  3. ONSET: \"When did this  start?\"      A few days ago  4. PAIN: \"Is there any pain?\" If Yes, ask: \"How bad is it?\" (Scale: 1-10; mild, moderate, severe)    -  MILD (1-3): doesn't interfere with normal activities     -  MODERATE (4-7): interferes with normal activities (e.g., work or school) or awakens from sleep      -  SEVERE (8-10): excruciating pain, unable to do any normal activities      0/10  5. CAUSE: \"What do you think is causing the symptoms?\"      unsure  6. OTHER SYMPTOMS: \"Do you have any other symptoms?\" (e.g., fever, vaginal bleeding, pain with urination)      denies  7. PREGNANCY: \"Is there any chance you are pregnant?\" \"When was your last menstrual period?\"    Chance, but doesn't think so.  6/24/24    Protocols used: Vulvar Symptoms-ADULT-[place]    "

## 2024-07-15 NOTE — PROGRESS NOTES
PROBLEM GYNECOLOGICAL VISIT    Sultana Dolan is a 24 y.o. female who presents today with complaint of itching and burning.  Last seen 2024 .  Her general medical history has been reviewed and she reports it as follows:    Past Medical History:   Diagnosis Date   • Depression    • Dermagraphy    • Hypothyroidism      Past Surgical History:   Procedure Laterality Date   • ANTERIOR CRUCIATE LIGAMENT REPAIR      and    • WISDOM TOOTH EXTRACTION  2018     OB History        0    Para   0    Term   0       0    AB   0    Living   0       SAB   0    IAB   0    Ectopic   0    Multiple   0    Live Births   0               Social History     Tobacco Use   • Smoking status: Never     Passive exposure: Never   • Smokeless tobacco: Never   Vaping Use   • Vaping status: Never Used   Substance Use Topics   • Alcohol use: Yes     Alcohol/week: 4.0 standard drinks of alcohol     Types: 4 Glasses of wine per week     Comment: socially   • Drug use: Never       Current Outpatient Medications   Medication Instructions   • clotrimazole-betamethasone (LOTRISONE) 1-0.05 % cream Topical, 2 times daily   • etonogestrel-ethinyl estradiol (NuvaRing) 0.12-0.015 MG/24HR vaginal ring Insert vaginally and leave in place for 3 consecutive weeks, then remove for 1 week.   • fluconazole (DIFLUCAN) 150 mg, Oral, Daily   • levothyroxine 125 mcg, Oral, Every morning       History of Present Illness:   + vaginal itching     Review of Systems:  Review of Systems   Constitutional:  Negative for chills and fever.   HENT:  Negative for ear pain and sore throat.    Eyes:  Negative for pain and visual disturbance.   Respiratory:  Negative for cough and shortness of breath.    Cardiovascular:  Negative for chest pain and palpitations.   Gastrointestinal:  Negative for abdominal pain and vomiting.   Endocrine: Negative.    Genitourinary:  Negative for dysuria and hematuria.   Musculoskeletal:  Negative for arthralgias and back  "pain.   Skin:  Negative for color change and rash.   Allergic/Immunologic: Negative.    Neurological: Negative.  Negative for seizures and syncope.   Hematological: Negative.    Psychiatric/Behavioral: Negative.     All other systems reviewed and are negative.      Physical Exam:  /72 (BP Location: Left arm, Patient Position: Sitting, Cuff Size: Standard)   Ht 5' 2\" (1.575 m)   Wt 65.3 kg (144 lb)   LMP 06/24/2024   BMI 26.34 kg/m²   Physical Exam  Constitutional:       Appearance: Normal appearance. She is normal weight.   Genitourinary:      Vulva, bladder and rectum normal.      No lesions in the vagina.      Genitourinary Comments: 1. Erythema        Right Labia: No rash, tenderness, lesions or skin changes.     Left Labia: No tenderness, lesions, skin changes or rash.     No labial fusion noted.            No inguinal adenopathy present in the right or left side.     No vaginal discharge, erythema, tenderness or bleeding.      No vaginal prolapse present.     Cervix is not nulliparous or parous.      No cervical motion tenderness, discharge, friability, lesion, polyp, nabothian cyst, eversion or elongation.      No urethral prolapse, tenderness, mass, hypermobility or discharge present.      Pelvic floor neuro is intact.     Pelvic exam was performed with patient in the lithotomy position and normal support.   Cardiovascular:      Rate and Rhythm: Normal rate and regular rhythm.   Pulmonary:      Effort: Pulmonary effort is normal.      Breath sounds: Normal breath sounds.   Abdominal:      General: Abdomen is flat. Bowel sounds are normal. There is no distension.      Palpations: Abdomen is soft. There is no splenomegaly or mass.      Tenderness: There is no abdominal tenderness. There is no right CVA tenderness, guarding or rebound.      Hernia: There is no hernia in the left inguinal area or right inguinal area.   Musculoskeletal:      Cervical back: Normal range of motion and neck supple. "   Lymphadenopathy:      Lower Body: No right inguinal adenopathy. No left inguinal adenopathy.   Neurological:      General: No focal deficit present.      Mental Status: She is alert and oriented to person, place, and time.   Skin:     General: Skin is warm and dry.   Psychiatric:         Mood and Affect: Mood normal.         Behavior: Behavior normal.         Thought Content: Thought content normal.         Judgment: Judgment normal.   Vitals and nursing note reviewed.       Point of Care Testing:   -Wet mount: 4   -KOH mount: +   -Whiff: neg      Assessment:   1. Vulvitis  and vaginitis      Plan:   Open to air no under wear to bed a night  .   Diflucan 150 mg today and repeat  in 3 d .   Lotisone  pea size   bid for  7-10 d  Aquaphor  always applied!   Decrease sugar intake       Reviewed with patient that [facility], but that they will not necessarily be reviewed by me immediately.  Explained that I will review results at my earliest opportunity and contact patient appropriately.

## 2024-07-16 ENCOUNTER — TELEPHONE (OUTPATIENT)
Age: 24
End: 2024-07-16

## 2024-07-16 ENCOUNTER — OFFICE VISIT (OUTPATIENT)
Dept: OBGYN CLINIC | Facility: CLINIC | Age: 24
End: 2024-07-16
Payer: COMMERCIAL

## 2024-07-16 VITALS
BODY MASS INDEX: 26.5 KG/M2 | SYSTOLIC BLOOD PRESSURE: 102 MMHG | HEIGHT: 62 IN | DIASTOLIC BLOOD PRESSURE: 72 MMHG | WEIGHT: 144 LBS

## 2024-07-16 DIAGNOSIS — N76.1 SUBACUTE VAGINITIS: Primary | ICD-10-CM

## 2024-07-16 DIAGNOSIS — E03.9 ACQUIRED HYPOTHYROIDISM: ICD-10-CM

## 2024-07-16 PROCEDURE — 87210 SMEAR WET MOUNT SALINE/INK: CPT | Performed by: OBSTETRICS & GYNECOLOGY

## 2024-07-16 PROCEDURE — 99213 OFFICE O/P EST LOW 20 MIN: CPT | Performed by: OBSTETRICS & GYNECOLOGY

## 2024-07-16 RX ORDER — FLUCONAZOLE 150 MG/1
150 TABLET ORAL DAILY
Qty: 2 TABLET | Refills: 0 | Status: SHIPPED | OUTPATIENT
Start: 2024-07-16 | End: 2024-07-18

## 2024-07-16 RX ORDER — CLOTRIMAZOLE AND BETAMETHASONE DIPROPIONATE 10; .64 MG/G; MG/G
CREAM TOPICAL 2 TIMES DAILY
Qty: 15 G | Refills: 0 | Status: SHIPPED | OUTPATIENT
Start: 2024-07-16

## 2024-07-16 NOTE — TELEPHONE ENCOUNTER
Pt called looking to schedule a TB test this week. Scheduled nurse visit for tomorrow in AM, please put an order in pts chart for TB read, thank you.

## 2024-07-21 PROBLEM — Z11.1 ENCOUNTER FOR PPD TEST: Status: ACTIVE | Noted: 2024-06-24

## 2024-07-21 NOTE — PROGRESS NOTES
Assessment/Plan:    No problem-specific Assessment & Plan notes found for this encounter.       Diagnoses and all orders for this visit:    Encounter for PPD test  -     TB Skin Test          Subjective:      Patient ID: Sultana Dolan is a 24 y.o. female.    ppd        The following portions of the patient's history were reviewed and updated as appropriate: allergies, current medications, past family history, past medical history, past social history, past surgical history, and problem list.    Review of Systems   Constitutional:  Negative for activity change and fatigue.   HENT:  Negative for ear discharge, ear pain, rhinorrhea and sore throat.    Eyes:  Negative for pain and visual disturbance.   Respiratory:  Negative for cough and shortness of breath.    Cardiovascular:  Negative for chest pain and leg swelling.   Gastrointestinal:  Negative for abdominal pain, constipation and diarrhea.   Endocrine: Negative for cold intolerance and polyuria.   Genitourinary:  Negative for flank pain and hematuria.   Musculoskeletal:  Negative for back pain and joint swelling.   Skin:  Negative for pallor and wound.   Neurological:  Negative for dizziness, seizures and speech difficulty.   Psychiatric/Behavioral:  Negative for confusion and hallucinations.          Objective:      LMP 06/24/2024          Physical Exam  Vitals and nursing note reviewed.   Constitutional:       General: She is not in acute distress.     Appearance: Normal appearance. She is not ill-appearing.   HENT:      Head: Normocephalic.      Right Ear: External ear normal. There is no impacted cerumen.      Left Ear: External ear normal. There is no impacted cerumen.      Nose: No congestion or rhinorrhea.      Mouth/Throat:      Pharynx: No posterior oropharyngeal erythema.   Eyes:      General: No scleral icterus.        Right eye: No discharge.         Left eye: No discharge.   Neck:      Vascular: No carotid bruit.   Cardiovascular:      Rate and  Rhythm: Normal rate and regular rhythm.      Heart sounds: Normal heart sounds. No murmur heard.     No friction rub. No gallop.   Pulmonary:      Breath sounds: No wheezing or rhonchi.   Abdominal:      General: There is no distension.      Tenderness: There is no abdominal tenderness. There is no guarding.   Musculoskeletal:         General: No swelling.      Cervical back: No rigidity.      Right lower leg: No edema.      Left lower leg: No edema.   Lymphadenopathy:      Cervical: No cervical adenopathy.   Skin:     Coloration: Skin is not jaundiced.   Neurological:      Mental Status: She is alert.      Cranial Nerves: No cranial nerve deficit.      Motor: No weakness.      Coordination: Coordination normal.   Psychiatric:         Mood and Affect: Mood normal.

## 2024-07-22 ENCOUNTER — OFFICE VISIT (OUTPATIENT)
Dept: INTERNAL MEDICINE CLINIC | Facility: CLINIC | Age: 24
End: 2024-07-22
Payer: COMMERCIAL

## 2024-07-22 DIAGNOSIS — Z11.1 ENCOUNTER FOR PPD TEST: Primary | ICD-10-CM

## 2024-07-22 PROBLEM — R10.10 PAIN OF UPPER ABDOMEN: Status: RESOLVED | Noted: 2023-03-17 | Resolved: 2024-07-22

## 2024-07-22 PROCEDURE — 86580 TB INTRADERMAL TEST: CPT | Performed by: INTERNAL MEDICINE

## 2024-07-24 ENCOUNTER — OFFICE VISIT (OUTPATIENT)
Dept: INTERNAL MEDICINE CLINIC | Facility: CLINIC | Age: 24
End: 2024-07-24

## 2024-07-24 DIAGNOSIS — Z11.1 ENCOUNTER FOR PPD SKIN TEST READING: Primary | ICD-10-CM

## 2024-07-24 LAB
INDURATION: 0 MM
TB SKIN TEST: NEGATIVE

## 2024-07-24 PROCEDURE — 99024 POSTOP FOLLOW-UP VISIT: CPT | Performed by: INTERNAL MEDICINE

## 2024-08-26 ENCOUNTER — ANNUAL EXAM (OUTPATIENT)
Dept: OBGYN CLINIC | Facility: CLINIC | Age: 24
End: 2024-08-26
Payer: COMMERCIAL

## 2024-08-26 VITALS
BODY MASS INDEX: 26.31 KG/M2 | DIASTOLIC BLOOD PRESSURE: 64 MMHG | HEIGHT: 62 IN | WEIGHT: 143 LBS | SYSTOLIC BLOOD PRESSURE: 106 MMHG

## 2024-08-26 DIAGNOSIS — Z11.3 SCREEN FOR STD (SEXUALLY TRANSMITTED DISEASE): ICD-10-CM

## 2024-08-26 DIAGNOSIS — Z01.419 WELL WOMAN EXAM: Primary | ICD-10-CM

## 2024-08-26 PROCEDURE — S0612 ANNUAL GYNECOLOGICAL EXAMINA: HCPCS | Performed by: OBSTETRICS & GYNECOLOGY

## 2024-08-26 NOTE — PROGRESS NOTES
[facility] - [address], [address], [place] [address]    ASSESSMENT/PLAN: Sultana Dolan is a 24 y.o.  who presents for annual gynecologic exam.    Encounter for routine gynecologic examination  - Routine well woman exam completed today.  - Cervical Cancer Screening: Current ASCCP Guidelines reviewed. Last Pap: 2023 . History of abnormal: denies  - HPV Vaccination status: Immunization series complete  - STI screening offered including HIV testing: offered, pt declined  - Contraceptive counseling discussed.  Current contraception: Nuvaring:   - The following were reviewed in today's visit: breast self exam, adequate intake of calcium and vitamin D, exercise, and healthy diet    Additional problems addressed during this visit:  1. Well woman exam  2. Screen for STD (sexually transmitted disease)  -     Chlamydia/GC DACIA, Confirmation      CC:  Annual Gynecologic Examination    HPI: Sultana Dolan is a 24 y.o.  who presents for annual gynecologic examination. She reports a monthly menses, not painful or heavy.     ROS: Negative except as noted in HPI    Patient's last menstrual period was 2024.       She  reports being sexually active and has had partner(s) who are male. She reports using the following methods of birth control/protection: Condom Male and Ring.       The following portions of the patient's history were reviewed and updated as appropriate:   Past Medical History:   Diagnosis Date    Depression 2016    Dermagraphy     Hypothyroidism      Past Surgical History:   Procedure Laterality Date    ANTERIOR CRUCIATE LIGAMENT REPAIR      and 2019    WISDOM TOOTH EXTRACTION  2018     Family History   Problem Relation Age of Onset    Thyroid disease Mother     Cancer Father     Migraines Maternal Grandmother     Thyroid disease Maternal Grandmother     Cancer Paternal Grandfather     Breast cancer Paternal Grandmother     Heart disease Paternal Grandmother   "   Asthma Brother      Social History     Socioeconomic History    Marital status: Single     Spouse name: None    Number of children: None    Years of education: None    Highest education level: None   Occupational History    None   Tobacco Use    Smoking status: Never     Passive exposure: Never    Smokeless tobacco: Never   Vaping Use    Vaping status: Never Used   Substance and Sexual Activity    Alcohol use: Yes     Alcohol/week: 4.0 standard drinks of alcohol     Types: 4 Glasses of wine per week     Comment: socially    Drug use: Never    Sexual activity: Yes     Partners: Male     Birth control/protection: Condom Male, Ring   Other Topics Concern    None   Social History Narrative    None     Social Determinants of Health     Financial Resource Strain: Not on file   Food Insecurity: Not on file   Transportation Needs: Not on file   Physical Activity: Not on file   Stress: Not on file   Social Connections: Not on file   Intimate Partner Violence: Not on file   Housing Stability: Not on file     Outpatient Medications Marked as Taking for the 8/26/24 encounter (Annual Exam) with Huey Weaver V DO   Medication    clotrimazole-betamethasone (LOTRISONE) 1-0.05 % cream    etonogestrel-ethinyl estradiol (NuvaRing) 0.12-0.015 MG/24HR vaginal ring    levothyroxine 125 mcg tablet     Allergies   Allergen Reactions    Gluten Meal - Food Allergy GI Intolerance           Objective:  /64 (BP Location: Left arm, Patient Position: Sitting, Cuff Size: Standard)   Ht 5' 2\" (1.575 m)   Wt 64.9 kg (143 lb)   LMP 07/30/2024   BMI 26.16 kg/m²        Chaperone present? Pt declined    General Appearance: alert and oriented, in no acute distress.   Respiratory: Unlabored breathing.  Abdomen: Soft, non-tender, non-distended, no masses, no rebound or guarding.  Breast Exam: No dimpling, nipple retraction or discharge. No lumps or masses. No axillary or supraclavicular nodes.   Pelvic:   External genitalia: Normal " appearance, no abnormal pigmentation, no lesions or masses. Normal Bartholin's and Inland's.      Urinary system: Urethral meatus normal,       Bladder non-tender.      Vaginal: normal mucosa without prolapse or lesions. Normal-appearing physiologic discharge.      Cervix: Normal-appearing, well-epithelialized, no gross lesions or masses. No cervical motion tenderness.      Adnexa: No adnexal masses or tenderness noted.      Uterus: Normal-sized, regular contour, midline, mobile, no uterine tenderness.  Extremities: Normal range of motion. Warm, well-perfused, non-tender.   Skin: normal, no rash or abnormalities  Neurologic: alert, oriented x3  Psychiatric: Appropriate affect, mood stable, cooperative with exam.        Huey Weaver DO  8/26/2024 1:47 PM

## 2024-09-01 LAB
C TRACH RRNA SPEC QL NAA+PROBE: NEGATIVE
N GONORRHOEA RRNA SPEC QL NAA+PROBE: NEGATIVE

## 2024-09-23 ENCOUNTER — NURSE TRIAGE (OUTPATIENT)
Age: 24
End: 2024-09-23

## 2024-09-23 DIAGNOSIS — N89.8 VAGINA ITCHING: Primary | ICD-10-CM

## 2024-09-23 RX ORDER — FLUCONAZOLE 150 MG/1
150 TABLET ORAL DAILY
Qty: 1 TABLET | Refills: 0 | Status: SHIPPED | OUTPATIENT
Start: 2024-09-23 | End: 2024-09-24

## 2024-09-23 RX ORDER — NYSTATIN AND TRIAMCINOLONE ACETONIDE 100000; 1 [USP'U]/G; MG/G
CREAM TOPICAL 2 TIMES DAILY
Qty: 15 G | Refills: 0 | Status: SHIPPED | OUTPATIENT
Start: 2024-09-23

## 2024-09-23 NOTE — TELEPHONE ENCOUNTER
"Symptoms started Wed 18th night - vaginal discharge and itching. Thursday/Friday /Saturday used 3 days of Monistat.   White thick vaginal discharge slight clumpy no odor, outside vaginal irritation with moderate itching and burning. No fever, No urinary symptoms. Last Monday started on Amoxicillin for cold symptoms. Has had yeast infections in past and usually Monistat works, requested pill and prescription itch cream both she had combo ordered in past.   Per protocol:   \"How many yeast infections have you had in the past 2 years?\" 2   -If 1 or less, prescribe Diflucan 150mg PO. If no improvement after 1 dose treatment, please instruct patient to call back to schedule appointment. (should be seen within 3 days)    Advised patient to monitor symptoms and call back any; if symptoms do not resolve 3 days after treatment, any fever, change in vaginal discharge, bleeding, worsening pain or any other concerns. [facility] on file confirmed.   Patient verbalized understanding and thankful for med order. Made aware will check with provider about prescription cream to use with oral Diflucan.     Reason for Disposition   Symptoms of a vaginal yeast infection (i.e., white, thick, cottage-cheese-like, itchy, not bad smelling discharge)    Answer Assessment - Initial Assessment Questions  1. DISCHARGE: \"Describe the discharge.\" (e.g., white, yellow, green, gray, foamy, cottage cheese-like)      Thick white vaginal   2. ODOR: \"Is there a bad odor?\"      denies  3. ONSET: \"When did the discharge begin?\"      Wed 9/18  4. RASH: \"Is there a rash in that area?\" If Yes, ask: \"Describe it.\" (e.g., redness, blisters, sores, bumps)      denies  5. ABDOMINAL PAIN: \"Are you having any abdominal pain?\" If Yes, ask: \"What does it feel like? \" (e.g., crampy, dull, intermittent, constant)       Burning   6. ABDOMINAL PAIN SEVERITY: If present, ask: \"How bad is it?\"  (e.g., mild, moderate, severe)   - MILD - doesn't interfere with normal activities    " "- MODERATE - interferes with normal activities or awakens from sleep    - SEVERE - patient doesn't want to move (R/O peritonitis)       Burning vaginal discomfort   7. CAUSE: \"What do you think is causing the discharge?\" \"Have you had the same problem before? What happened then?\"      Yeast infection   8. OTHER SYMPTOMS: \"Do you have any other symptoms?\" (e.g., fever, itching, vaginal bleeding, pain with urination, injury to genital area, vaginal foreign body)      Itching and burning discomfort   9. PREGNANCY: \"Is there any chance you are pregnant?\" \"When was your last menstrual period?\"      LMP 9/6 Nuva ring    Protocols used: Vaginal Discharge-ADULT-[place]    "

## 2024-09-23 NOTE — TELEPHONE ENCOUNTER
Outgoing call to patient. No answer. Ok to leave message per communication form. Message left informing medication sent in to pharmacy.

## 2024-09-26 ENCOUNTER — OFFICE VISIT (OUTPATIENT)
Dept: OBGYN CLINIC | Facility: CLINIC | Age: 24
End: 2024-09-26
Payer: COMMERCIAL

## 2024-09-26 VITALS
HEIGHT: 62 IN | WEIGHT: 145.8 LBS | DIASTOLIC BLOOD PRESSURE: 62 MMHG | BODY MASS INDEX: 26.83 KG/M2 | SYSTOLIC BLOOD PRESSURE: 92 MMHG

## 2024-09-26 DIAGNOSIS — E03.8 OTHER SPECIFIED HYPOTHYROIDISM: ICD-10-CM

## 2024-09-26 DIAGNOSIS — N89.8 VAGINAL DISCHARGE: ICD-10-CM

## 2024-09-26 DIAGNOSIS — N76.1 SUBACUTE VAGINITIS: Primary | ICD-10-CM

## 2024-09-26 PROCEDURE — 99213 OFFICE O/P EST LOW 20 MIN: CPT | Performed by: OBSTETRICS & GYNECOLOGY

## 2024-09-26 PROCEDURE — 87210 SMEAR WET MOUNT SALINE/INK: CPT | Performed by: OBSTETRICS & GYNECOLOGY

## 2024-09-26 RX ORDER — FLUCONAZOLE 150 MG/1
150 TABLET ORAL ONCE
Qty: 2 TABLET | Refills: 0 | Status: SHIPPED | OUTPATIENT
Start: 2024-09-26 | End: 2024-09-26

## 2024-09-26 NOTE — PROGRESS NOTES
PROBLEM GYNECOLOGICAL VISIT    Sultana Dolan is a 24 y.o. female who presents today with complaint of itching  Her general medical history has been reviewed and she reports it as follows:    Past Medical History:   Diagnosis Date    Depression 2016    Dermagraphy     Hypothyroidism      Past Surgical History:   Procedure Laterality Date    ANTERIOR CRUCIATE LIGAMENT REPAIR      and 2019    WISDOM TOOTH EXTRACTION  2018     OB History          0    Para   0    Term   0       0    AB   0    Living   0         SAB   0    IAB   0    Ectopic   0    Multiple   0    Live Births   0               Social History     Tobacco Use    Smoking status: Never     Passive exposure: Never    Smokeless tobacco: Never   Vaping Use    Vaping status: Never Used   Substance Use Topics    Alcohol use: Yes     Alcohol/week: 4.0 standard drinks of alcohol     Types: 4 Glasses of wine per week     Comment: socially    Drug use: Never       Current Outpatient Medications   Medication Instructions    clotrimazole-betamethasone (LOTRISONE) 1-0.05 % cream Topical, 2 times daily    etonogestrel-ethinyl estradiol (NuvaRing) 0.12-0.015 MG/24HR vaginal ring Insert vaginally and leave in place for 3 consecutive weeks, then remove for 1 week.    levothyroxine 125 mcg, Oral, Every morning    nystatin-triamcinolone (MYCOLOG-II) cream Topical, 2 times daily, As needed for itching       History of Present Illness:   Pt used  abx last week .   had  a great deal of itching and  could  barely walk.  + used one dose of  diflucan on  Monday. No results. + using lotrisone to the vaginal opening but sore..  Same partner      Review of Systems:  Review of Systems   Constitutional: Negative.    Gastrointestinal: Negative.    Genitourinary:  Positive for vaginal discharge and vaginal pain. Negative for decreased urine volume, difficulty urinating, dyspareunia, dysuria, frequency, menstrual problem, pelvic pain, urgency and vaginal  bleeding.   Skin: Negative.    Allergic/Immunologic: Negative.    Neurological: Negative.    Hematological: Negative.    Psychiatric/Behavioral: Negative.       Physical Exam:  There were no vitals taken for this visit.  Physical Exam  Genitourinary:      Vulva, bladder, rectum and urethral meatus normal.      No lesions in the vagina.      Right Labia: No rash, tenderness, lesions, skin changes or Bartholin's cyst.     Left Labia: No tenderness, lesions, skin changes, Bartholin's cyst or rash.     No inguinal adenopathy present in the right or left side.     Pelvic Chas Score: 4/5.     Vaginal discharge and erythema present.      No vaginal tenderness, bleeding, ulceration, mesh exposure, granulation tissue or cuff induration.      Mild vaginal atrophy present.       Right Adnexa: not tender, not full and no mass present.     Left Adnexa: not tender and no mass present.     No cervical motion tenderness or friability.      Uterus is not enlarged, tender or irregular.      No urethral prolapse or tenderness present.      Pelvic Floor: Levator muscle strength is 4/5.     Pelvic floor neuro is intact.     Pelvic exam was performed with patient in the lithotomy position.   Abdominal:      General: Abdomen is flat.      Palpations: Abdomen is soft.      Hernia: There is no hernia in the left inguinal area or right inguinal area.   Musculoskeletal:         General: Normal range of motion.   Lymphadenopathy:      Lower Body: No right inguinal adenopathy. No left inguinal adenopathy.   Skin:     General: Skin is warm and dry.   Psychiatric:         Mood and Affect: Mood normal.         Behavior: Behavior normal.         Thought Content: Thought content normal.         Judgment: Judgment normal.   Vitals and nursing note reviewed.     Point of Care Testing:   Ph 4 neg whiff no clue  + budding        Assessment:   1.  Vaginitis       Plan:   Wet mount done , vaginitis   nuswab + sent,  diflucan 150 mg today and repeat in 3  days. Stop  steroid ointment and use  Aquaphor.  Fu after results are in.     Reviewed with patient that [facility], but that they will not necessarily be reviewed by me immediately.  Explained that I will review results at my earliest opportunity and contact patient appropriately.

## 2024-09-29 LAB
A VAGINAE DNA VAG QL NAA+PROBE: NORMAL SCORE
BVAB2 DNA VAG QL NAA+PROBE: NORMAL SCORE
C ALBICANS DNA VAG QL NAA+PROBE: NEGATIVE
C GLABRATA DNA VAG QL NAA+PROBE: NEGATIVE
C TRACH RRNA SPEC QL NAA+PROBE: NEGATIVE
MEGA1 DNA VAG QL NAA+PROBE: NORMAL SCORE
N GONORRHOEA RRNA SPEC QL NAA+PROBE: NEGATIVE
T VAGINALIS RRNA SPEC QL NAA+PROBE: NEGATIVE

## 2024-10-11 ENCOUNTER — NURSE TRIAGE (OUTPATIENT)
Age: 24
End: 2024-10-11

## 2024-10-11 NOTE — TELEPHONE ENCOUNTER
"5w0d OB patient called in advising she is having some burning during intercourse. This started about 1 week ago after she was treated for a yeast infection. Reports some urinary frequency however no other urinary symptoms. States the burning is only during IC and shortly after but then resolves. None at present. Scheduled patient for evaluation on Monday. Discussed tylenol PRN for pain and pelvic rest. No further questions.       Reason for Disposition   Pain with sexual intercourse (dyspareunia)  (Exception: feels like prior yeast infection, minor abrasion, minor rash < 24 hour duration, mild itching)    Answer Assessment - Initial Assessment Questions  1. SYMPTOM: \"What's the main symptom you're concerned about?\" (e.g., pain, itching, dryness)      Burning during IC  2. LOCATION: \"Where is the  burning located?\" (e.g., inside/outside, left/right)      internal  3. ONSET: \"When did the  burning  start?\"      1 week ago  4. PAIN: \"Is there any pain?\" If Yes, ask: \"How bad is it?\" (Scale: 1-10; mild, moderate, severe)      Burning with intercourse, none otherwise  5. ITCHING: \"Is there any itching?\" If Yes, ask: \"How bad is it?\" (Scale: 1-10; mild, moderate, severe)      No  6. CAUSE: \"What do you think is causing the discharge?\" \"Have you had the same problem before? What happened then?\"      N/A   7. OTHER SYMPTOMS: \"Do you have any other symptoms?\" (e.g., fever, itching, vaginal bleeding, pain with urination, injury to genital area, vaginal foreign body)      N/A  8. PREGNANCY: \"Is there any chance you are pregnant?\" \"When was your last menstrual period?\"      09/06/2024- yes pregnant    Protocols used: Vaginal Symptoms-ADULT-[place]    "

## 2024-10-14 ENCOUNTER — OFFICE VISIT (OUTPATIENT)
Dept: OBGYN CLINIC | Facility: CLINIC | Age: 24
End: 2024-10-14
Payer: COMMERCIAL

## 2024-10-14 VITALS
DIASTOLIC BLOOD PRESSURE: 60 MMHG | SYSTOLIC BLOOD PRESSURE: 100 MMHG | BODY MASS INDEX: 27.05 KG/M2 | HEIGHT: 62 IN | WEIGHT: 147 LBS

## 2024-10-14 DIAGNOSIS — R20.8 BURNING SENSATION: ICD-10-CM

## 2024-10-14 DIAGNOSIS — K90.0 CELIAC DISEASE: ICD-10-CM

## 2024-10-14 DIAGNOSIS — E03.8 OTHER SPECIFIED HYPOTHYROIDISM: ICD-10-CM

## 2024-10-14 DIAGNOSIS — E03.9 ACQUIRED HYPOTHYROIDISM: ICD-10-CM

## 2024-10-14 DIAGNOSIS — N92.6 LATE MENSES: Primary | ICD-10-CM

## 2024-10-14 LAB
SL AMB  POCT GLUCOSE, UA: NORMAL
SL AMB LEUKOCYTE ESTERASE,UA: NORMAL
SL AMB POCT BILIRUBIN,UA: NORMAL
SL AMB POCT BLOOD,UA: NORMAL
SL AMB POCT KETONES,UA: NORMAL
SL AMB POCT NITRITE,UA: NORMAL
SL AMB POCT PH,UA: 5
SL AMB POCT SPECIFIC GRAVITY,UA: 1.01
SL AMB POCT URINE HCG: POSITIVE
SL AMB POCT URINE PROTEIN: NORMAL
SL AMB POCT UROBILINOGEN: 0.2

## 2024-10-14 PROCEDURE — 81002 URINALYSIS NONAUTO W/O SCOPE: CPT | Performed by: OBSTETRICS & GYNECOLOGY

## 2024-10-14 PROCEDURE — 81025 URINE PREGNANCY TEST: CPT | Performed by: OBSTETRICS & GYNECOLOGY

## 2024-10-14 PROCEDURE — 99213 OFFICE O/P EST LOW 20 MIN: CPT | Performed by: OBSTETRICS & GYNECOLOGY

## 2024-10-14 NOTE — PROGRESS NOTES
PROBLEM GYNECOLOGICAL VISIT    Sultana Dolan is a 24 y.o. female who presents today with complaint of burning vaginal  with relations.  Her general medical history has been reviewed and she reports it as follows:    Past Medical History:   Diagnosis Date    Depression 2016    Dermagraphy     Hypothyroidism      Past Surgical History:   Procedure Laterality Date    ANTERIOR CRUCIATE LIGAMENT REPAIR      and 2019    WISDOM TOOTH EXTRACTION  2018     OB History          0    Para   0    Term   0       0    AB   0    Living   0         SAB   0    IAB   0    Ectopic   0    Multiple   0    Live Births   0               Social History     Tobacco Use    Smoking status: Never     Passive exposure: Never    Smokeless tobacco: Never   Vaping Use    Vaping status: Never Used   Substance Use Topics    Alcohol use: Yes     Alcohol/week: 4.0 standard drinks of alcohol     Types: 4 Glasses of wine per week     Comment: socially    Drug use: Never       Current Outpatient Medications   Medication Instructions    clotrimazole-betamethasone (LOTRISONE) 1-0.05 % cream Topical, 2 times daily    etonogestrel-ethinyl estradiol (NuvaRing) 0.12-0.015 MG/24HR vaginal ring Insert vaginally and leave in place for 3 consecutive weeks, then remove for 1 week.    levothyroxine 125 mcg, Oral, Every morning    nystatin-triamcinolone (MYCOLOG-II) cream Topical, 2 times daily, As needed for itching       History of Present Illness:   Pt treated  for  vaginitis one week ago.  Same partner   Neg one swab.  Lmp 9/6 .  Waited  to have intercourse for 8 d  and  felt burning.  No dysuria or frequency ,  No fevers or chills.  + HPT  10/6 boyfriend  excited.   No bleeding      Review of Systems:  Review of Systems   Constitutional: Negative.    Gastrointestinal: Negative.    Genitourinary:  Positive for dyspareunia and menstrual problem. Negative for decreased urine volume, difficulty urinating, vaginal bleeding, vaginal discharge  "and vaginal pain.   Skin: Negative.    Hematological: Negative.    Psychiatric/Behavioral: Negative.       Physical Exam:  /60 (BP Location: Left arm, Patient Position: Sitting, Cuff Size: Standard)   Ht 5' 2\" (1.575 m)   Wt 66.7 kg (147 lb)   LMP 09/06/2024 (Exact Date)   BMI 26.89 kg/m²   Physical Exam  Genitourinary:      Vulva, bladder, rectum and urethral meatus normal.      Genitourinary Comments: Uterus  6 week size      Right Labia: No rash, tenderness, lesions or skin changes.     Left Labia: No tenderness, lesions, skin changes or rash.     No inguinal adenopathy present in the right or left side.     Pelvic Chas Score: 4/5.     No vaginal discharge, erythema, bleeding, ulceration or granulation tissue.      No vaginal prolapse present.     No vaginal atrophy present.       Right Adnexa: not tender, not full and no mass present.     Left Adnexa: not tender, not full and no mass present.     Cervix is nulliparous.      No cervical friability or lesion.      Uterus is enlarged.      Urethral hypermobility present.      No urethral prolapse, tenderness, mass or discharge present.      Pelvic Floor: Levator muscle strength is 4/5.     Pelvic floor neuro is intact.     Pelvic exam was performed with patient in the lithotomy position and normal support.   Abdominal:      Palpations: Abdomen is soft.      Hernia: There is no hernia in the left inguinal area or right inguinal area.   Musculoskeletal:         General: Normal range of motion.   Lymphadenopathy:      Lower Body: No right inguinal adenopathy. No left inguinal adenopathy.   Neurological:      Mental Status: She is oriented to person, place, and time.   Skin:     General: Skin is warm and dry.   Psychiatric:         Mood and Affect: Mood normal.         Behavior: Behavior normal.         Thought Content: Thought content normal.         Judgment: Judgment normal.   Vitals and nursing note reviewed.         Assessment:   1.  + pregnancy test  " lmp 9/6  no bleeding   dysparuina     Plan:   Report bleeding, not keeping anything down for 24 hours,    vaginal rest until dating Ultrasound  No smoking or alcohol intake. Start prenatal vitamin  one po every day .  Negative urinr dip in office today.  Pt denies any genetic disorders in either family .     Reviewed with patient that test results are available in [facility] immediately, but that they will not necessarily be reviewed by me immediately.  Explained that I will review results at my earliest opportunity and contact patient appropriately.

## 2024-10-20 DIAGNOSIS — E07.9 THYROID DISORDER: ICD-10-CM

## 2024-10-21 RX ORDER — LEVOTHYROXINE SODIUM 125 UG/1
125 TABLET ORAL EVERY MORNING
Qty: 30 TABLET | Refills: 0 | Status: SHIPPED | OUTPATIENT
Start: 2024-10-21

## 2024-11-05 ENCOUNTER — ULTRASOUND (OUTPATIENT)
Dept: OBGYN CLINIC | Facility: CLINIC | Age: 24
End: 2024-11-05
Payer: COMMERCIAL

## 2024-11-05 VITALS
WEIGHT: 147 LBS | DIASTOLIC BLOOD PRESSURE: 60 MMHG | BODY MASS INDEX: 27.05 KG/M2 | SYSTOLIC BLOOD PRESSURE: 98 MMHG | HEIGHT: 62 IN

## 2024-11-05 DIAGNOSIS — N91.2 AMENORRHEA: Primary | ICD-10-CM

## 2024-11-05 DIAGNOSIS — Z3A.08 8 WEEKS GESTATION OF PREGNANCY: ICD-10-CM

## 2024-11-05 PROCEDURE — 99213 OFFICE O/P EST LOW 20 MIN: CPT | Performed by: OBSTETRICS & GYNECOLOGY

## 2024-11-05 PROCEDURE — 76817 TRANSVAGINAL US OBSTETRIC: CPT | Performed by: OBSTETRICS & GYNECOLOGY

## 2024-11-05 RX ORDER — LANOLIN ALCOHOL/MO/W.PET/CERES
400 CREAM (GRAM) TOPICAL DAILY
COMMUNITY

## 2024-11-05 NOTE — PROGRESS NOTES
"Routine Prenatal Visit  [facility] - [address], [address], [place] [address]      Assessment/Plan:  24 y.o.  presenting with missed menses.  Viable pregnancy 8w4d by LMP consistent with ultrasound today.  - Continue/start prenatal vitamin  - We reviewed her current medications and discussed which are safe to continue in pregnancy  - We briefly discussed options for aneuploidy screening, to be discussed further at the prenatal intake  - Schedule prenatal intake with RN and initial prenatal visit; prenatal labs will be ordered during the prenatal intake      Subjective:    CC: Missed period    Sultana Dolan is a 24 y.o.  who presents with missed menses.  Patient's last menstrual period was 2024 (exact date).    Patient notes that this pregnancy was unplanned and desired.  She was using contraception (Nuvaring) at the time of conception but may not have been using as recommended. She reports she is certain of her LMP and that she has regular menses. She has has no vaginal bleeding since her LMP.    Objective:  BP 98/60 (BP Location: Right arm, Patient Position: Sitting, Cuff Size: Standard)   Ht 5' 2\" (1.575 m)   Wt 66.7 kg (147 lb)   LMP 2024 (Exact Date)   BMI 26.89 kg/m²     Physical Exam:  General: Well appearing, no distress  CV: Regular rate  Respiratory: Unlabored breathing  Abdomen: Soft, nontender  Extremities: Without edema  Mood and Affect: Appropriate    Transvaginal Pelvic Ultrasound  Garcia IUP  Yolk sac: Present  Fetal Pole: Present  CRL consistent with EGA 8w6d by U/S (8w 4d by LMP)  Cardiac activity: Present   bpm  No adnexal masses appreciated    "

## 2024-11-05 NOTE — PROGRESS NOTES
Ultrasound Probe Disinfection    A transvaginal ultrasound was performed.   Prior to use, disinfection was performed with High Level Disinfection Process (Trophon)  Probe serial number [id number]:  [id number] was used    Luana Patel MA  11/05/24  4:45 PM

## 2024-11-06 ENCOUNTER — TELEPHONE (OUTPATIENT)
Age: 24
End: 2024-11-06

## 2024-11-07 ENCOUNTER — TELEPHONE (OUTPATIENT)
Age: 24
End: 2024-11-07

## 2024-11-11 PROBLEM — Z3A.09 9 WEEKS GESTATION OF PREGNANCY: Status: ACTIVE | Noted: 2024-11-11

## 2024-11-11 PROBLEM — Z11.59 NEED FOR HEPATITIS C SCREENING TEST: Status: ACTIVE | Noted: 2024-11-11

## 2024-11-11 PROBLEM — Z11.4 SCREENING FOR HIV (HUMAN IMMUNODEFICIENCY VIRUS): Status: ACTIVE | Noted: 2024-06-24

## 2024-11-11 PROBLEM — Z13.1 SCREENING FOR DIABETES MELLITUS: Status: ACTIVE | Noted: 2024-06-24

## 2024-11-11 PROBLEM — Z00.00 ANNUAL PHYSICAL EXAM: Status: ACTIVE | Noted: 2024-06-24

## 2024-11-11 NOTE — PATIENT INSTRUCTIONS
"Patient Education     Routine physical for adults   The Basics   Written by the doctors and editors at [facility]   What is a physical? -- A physical is a routine visit, or \"check-up,\" with your doctor. You might also hear it called a \"wellness visit\" or \"preventive visit.\"  During each visit, the doctor will:   Ask about your physical and mental health   Ask about your habits, behaviors, and lifestyle   Do an exam   Give you vaccines if needed   Talk to you about any medicines you take   Give advice about your health   Answer your questions  Getting regular check-ups is an important part of taking care of your health. It can help your doctor find and treat any problems you have. But it's also important for preventing health problems.  A routine physical is different from a \"sick visit.\" A sick visit is when you see a doctor because of a health concern or problem. Since physicals are scheduled ahead of time, you can think about what you want to ask the doctor.  How often should I get a physical? -- It depends on your age and health. In general, for people age 21 years and older:   If you are younger than 50 years, you might be able to get a physical every 3 years.   If you are 50 years or older, your doctor might recommend a physical every year.  If you have an ongoing health condition, like diabetes or high blood pressure, your doctor will probably want to see you more often.  What happens during a physical? -- In general, each visit will include:   Physical exam - The doctor or nurse will check your height, weight, heart rate, and blood pressure. They will also look at your eyes and ears. They will ask about how you are feeling and whether you have any symptoms that bother you.   Medicines - It's a good idea to bring a list of all the medicines you take to each doctor visit. Your doctor will talk to you about your medicines and answer any questions. Tell them if you are having any side effects that bother you. You " "should also tell them if you are having trouble paying for any of your medicines.   Habits and behaviors - This includes:   Your diet   Your exercise habits   Whether you smoke, drink alcohol, or use drugs   Whether you are sexually active   Whether you feel safe at home  Your doctor will talk to you about things you can do to improve your health and lower your risk of health problems. They will also offer help and support. For example, if you want to quit smoking, they can give you advice and might prescribe medicines. If you want to improve your diet or get more physical activity, they can help you with this, too.   Lab tests, if needed - The tests you get will depend on your age and situation. For example, your doctor might want to check your:   Cholesterol   Blood sugar   Iron level   Vaccines - The recommended vaccines will depend on your age, health, and what vaccines you already had. Vaccines are very important because they can prevent certain serious or deadly infections.   Discussion of screening - \"Screening\" means checking for diseases or other health problems before they cause symptoms. Your doctor can recommend screening based on your age, risk, and preferences. This might include tests to check for:   Cancer, such as breast, prostate, cervical, ovarian, colorectal, prostate, lung, or skin cancer   Sexually transmitted infections, such as chlamydia and gonorrhea   Mental health conditions like depression and anxiety  Your doctor will talk to you about the different types of screening tests. They can help you decide which screenings to have. They can also explain what the results might mean.   Answering questions - The physical is a good time to ask the doctor or nurse questions about your health. If needed, they can refer you to other doctors or specialists, too.  Adults older than 65 years often need other care, too. As you get older, your doctor will talk to you about:   How to prevent falling at " home   Hearing or vision tests   Memory testing   How to take your medicines safely   Making sure that you have the help and support you need at home  All topics are updated as new evidence becomes available and our peer review process is complete.  This topic retrieved from [employer] on: May 02, 2024.  Topic 353755 Version 1.0  Release: 32.4.3 - C32.122  © 2024 UpToDate, Inc. and/or its affiliates. All rights reserved.  Consumer Information Use and Disclaimer   Disclaimer: This generalized information is a limited summary of diagnosis, treatment, and/or medication information. It is not meant to be comprehensive and should be used as a tool to help the user understand and/or assess potential diagnostic and treatment options. It does NOT include all information about conditions, treatments, medications, side effects, or risks that may apply to a specific patient. It is not intended to be medical advice or a substitute for the medical advice, diagnosis, or treatment of a health care provider based on the health care provider's examination and assessment of a patient's specific and unique circumstances. Patients must speak with a health care provider for complete information about their health, medical questions, and treatment options, including any risks or benefits regarding use of medications. This information does not endorse any treatments or medications as safe, effective, or approved for treating a specific patient. UpToDate, Inc. and its affiliates disclaim any warranty or liability relating to this information or the use thereof.The use of this information is governed by the Terms of Use, available at https://www.woltersEnviroMissionuwer.com/en/know/clinical-effectiveness-terms. 2024© UpToDate, Inc. and its affiliates and/or licensors. All rights reserved.  Copyright   © 2024 UpToDate, Inc. and/or its affiliates. All rights reserved.

## 2024-11-11 NOTE — PROGRESS NOTES
"[facility]  form  Glasses        Adult Annual Physical  Name: Sultana Dolan      : 2000      MRN: [id number]  Encounter Provider: William Lerma DO  Encounter Date: 2024   Encounter department: [facility] INTERNAL MEDICINE    Assessment & Plan  9 weeks gestation of pregnancy         Celiac disease         Other specified hypothyroidism    Orders:    TSH, 3rd generation; Future    T4, free; Future    TSH, 3rd generation    T4, free    Need for hepatitis C screening test    Orders:    Hepatitis C Antibody; Future    Hepatitis C Antibody    Screening for HIV (human immunodeficiency virus)    Orders:    HIV 1/2 AG/AB w Reflex [facility] for 2 yr old and above; Future    Annual physical exam    Orders:    CBC (Includes Diff/Plt) (Refl); Future    Screening for diabetes mellitus         Screening for cardiovascular condition    Orders:    Comprehensive metabolic panel; Future    Comprehensive metabolic panel    Encounter for PPD test    Orders:    QuantiFERON-TB Gold Plus [facility]; Future    QuantiFERON-TB Gold Plus [facility]    Bipolar 1 disorder (HCC)         Enlarged pituitary gland (HCC)         Immunizations and preventive care screenings were discussed with patient today. Appropriate education was printed on patient's after visit summary.    Counseling:  Exercise: the importance of regular exercise/physical activity was discussed. Recommend exercise 3-5 times per week for at least 30 minutes.          History of Present Illness     Adult Annual Physical  Review of Systems      Objective     /60   Pulse 78   Temp 97.8 °F (36.6 °C)   Resp 12   Ht 5' 2\" (1.575 m)   Wt 66.7 kg (147 lb)   LMP 2024 (Exact Date)   SpO2 97%   BMI 26.89 kg/m²     Physical Exam    "

## 2024-11-12 ENCOUNTER — OFFICE VISIT (OUTPATIENT)
Dept: INTERNAL MEDICINE CLINIC | Facility: CLINIC | Age: 24
End: 2024-11-12
Payer: COMMERCIAL

## 2024-11-12 VITALS
HEIGHT: 62 IN | BODY MASS INDEX: 27.05 KG/M2 | SYSTOLIC BLOOD PRESSURE: 100 MMHG | WEIGHT: 147 LBS | RESPIRATION RATE: 12 BRPM | OXYGEN SATURATION: 97 % | DIASTOLIC BLOOD PRESSURE: 60 MMHG | TEMPERATURE: 97.8 F | HEART RATE: 78 BPM

## 2024-11-12 DIAGNOSIS — E23.6 ENLARGED PITUITARY GLAND (HCC): ICD-10-CM

## 2024-11-12 DIAGNOSIS — Z13.6 SCREENING FOR CARDIOVASCULAR CONDITION: ICD-10-CM

## 2024-11-12 DIAGNOSIS — Z11.59 NEED FOR HEPATITIS C SCREENING TEST: ICD-10-CM

## 2024-11-12 DIAGNOSIS — Z3A.09 9 WEEKS GESTATION OF PREGNANCY: ICD-10-CM

## 2024-11-12 DIAGNOSIS — Z11.1 ENCOUNTER FOR PPD TEST: ICD-10-CM

## 2024-11-12 DIAGNOSIS — Z11.4 SCREENING FOR HIV (HUMAN IMMUNODEFICIENCY VIRUS): ICD-10-CM

## 2024-11-12 DIAGNOSIS — Z13.1 SCREENING FOR DIABETES MELLITUS: ICD-10-CM

## 2024-11-12 DIAGNOSIS — F31.9 BIPOLAR 1 DISORDER (HCC): ICD-10-CM

## 2024-11-12 DIAGNOSIS — K90.0 CELIAC DISEASE: ICD-10-CM

## 2024-11-12 DIAGNOSIS — E03.8 OTHER SPECIFIED HYPOTHYROIDISM: ICD-10-CM

## 2024-11-12 DIAGNOSIS — Z00.00 ANNUAL PHYSICAL EXAM: Primary | ICD-10-CM

## 2024-11-12 PROCEDURE — 99395 PREV VISIT EST AGE 18-39: CPT | Performed by: INTERNAL MEDICINE

## 2024-11-20 DIAGNOSIS — E07.9 THYROID DISORDER: ICD-10-CM

## 2024-11-20 RX ORDER — LEVOTHYROXINE SODIUM 125 UG/1
125 TABLET ORAL EVERY MORNING
Qty: 90 TABLET | Refills: 1 | Status: SHIPPED | OUTPATIENT
Start: 2024-11-20 | End: 2024-11-24

## 2024-11-23 LAB
ALBUMIN SERPL-MCNC: 4.1 G/DL (ref 4–5)
ALP SERPL-CCNC: 63 IU/L (ref 44–121)
ALT SERPL-CCNC: 12 IU/L (ref 0–32)
AST SERPL-CCNC: 17 IU/L (ref 0–40)
BASOPHILS # BLD AUTO: 0 X10E3/UL (ref 0–0.2)
BASOPHILS NFR BLD AUTO: 0 %
BILIRUB SERPL-MCNC: 0.3 MG/DL (ref 0–1.2)
BUN SERPL-MCNC: 12 MG/DL (ref 6–20)
BUN/CREAT SERPL: 17 (ref 9–23)
CALCIUM SERPL-MCNC: 9.3 MG/DL (ref 8.7–10.2)
CHLORIDE SERPL-SCNC: 102 MMOL/L (ref 96–106)
CO2 SERPL-SCNC: 21 MMOL/L (ref 20–29)
CREAT SERPL-MCNC: 0.69 MG/DL (ref 0.57–1)
EGFR: 126 ML/MIN/1.73
EOSINOPHIL # BLD AUTO: 0.1 X10E3/UL (ref 0–0.4)
EOSINOPHIL NFR BLD AUTO: 1 %
ERYTHROCYTE [DISTWIDTH] IN BLOOD BY AUTOMATED COUNT: 13.1 % (ref 11.7–15.4)
GLOBULIN SER-MCNC: 2.2 G/DL (ref 1.5–4.5)
GLUCOSE SERPL-MCNC: 53 MG/DL (ref 70–99)
HCT VFR BLD AUTO: 37.9 % (ref 34–46.6)
HCV AB S/CO SERPL IA: NON REACTIVE
HGB BLD-MCNC: 12.2 G/DL (ref 11.1–15.9)
HIV 1+2 AB+HIV1 P24 AG SERPL QL IA: NON REACTIVE
IMM GRANULOCYTES # BLD: 0 X10E3/UL (ref 0–0.1)
IMM GRANULOCYTES NFR BLD: 0 %
LYMPHOCYTES # BLD AUTO: 1.2 X10E3/UL (ref 0.7–3.1)
LYMPHOCYTES NFR BLD AUTO: 12 %
MCH RBC QN AUTO: 29.3 PG (ref 26.6–33)
MCHC RBC AUTO-ENTMCNC: 32.2 G/DL (ref 31.5–35.7)
MCV RBC AUTO: 91 FL (ref 79–97)
MONOCYTES # BLD AUTO: 0.7 X10E3/UL (ref 0.1–0.9)
MONOCYTES NFR BLD AUTO: 7 %
NEUTROPHILS # BLD AUTO: 8.3 X10E3/UL (ref 1.4–7)
NEUTROPHILS NFR BLD AUTO: 80 %
PLATELET # BLD AUTO: 152 X10E3/UL (ref 150–450)
POTASSIUM SERPL-SCNC: 3.8 MMOL/L (ref 3.5–5.2)
PROT SERPL-MCNC: 6.3 G/DL (ref 6–8.5)
RBC # BLD AUTO: 4.16 X10E6/UL (ref 3.77–5.28)
SODIUM SERPL-SCNC: 138 MMOL/L (ref 134–144)
T4 FREE SERPL-MCNC: 1.09 NG/DL (ref 0.82–1.77)
TSH SERPL DL<=0.005 MIU/L-ACNC: 8.59 UIU/ML (ref 0.45–4.5)
WBC # BLD AUTO: 10.4 X10E3/UL (ref 3.4–10.8)

## 2024-11-24 ENCOUNTER — RESULTS FOLLOW-UP (OUTPATIENT)
Dept: INTERNAL MEDICINE CLINIC | Facility: CLINIC | Age: 24
End: 2024-11-24

## 2024-11-24 DIAGNOSIS — E03.9 ACQUIRED HYPOTHYROIDISM: Primary | ICD-10-CM

## 2024-11-24 RX ORDER — LEVOTHYROXINE SODIUM 150 UG/1
150 TABLET ORAL
Qty: 30 TABLET | Refills: 1 | Status: SHIPPED | OUTPATIENT
Start: 2024-11-24 | End: 2024-12-24

## 2024-11-25 ENCOUNTER — INITIAL PRENATAL (OUTPATIENT)
Dept: OBGYN CLINIC | Facility: CLINIC | Age: 24
End: 2024-11-25

## 2024-11-25 VITALS
DIASTOLIC BLOOD PRESSURE: 60 MMHG | SYSTOLIC BLOOD PRESSURE: 98 MMHG | BODY MASS INDEX: 27.27 KG/M2 | HEIGHT: 62 IN | WEIGHT: 148.2 LBS

## 2024-11-25 DIAGNOSIS — Z34.01 ENCOUNTER FOR SUPERVISION OF NORMAL FIRST PREGNANCY IN FIRST TRIMESTER: Primary | ICD-10-CM

## 2024-11-25 DIAGNOSIS — Z31.430 ENCOUNTER OF FEMALE FOR TESTING FOR GENETIC DISEASE CARRIER STATUS FOR PROCREATIVE MANAGEMENT: ICD-10-CM

## 2024-11-25 PROCEDURE — OBC: Performed by: PHYSICIAN ASSISTANT

## 2024-11-25 NOTE — PROGRESS NOTES
OB INTAKE INTERVIEW  Patient is 24 y.o. who presents for OB intake at 11 wks 3 days  She is accompanied by Dm during this encounter  The father of her baby (Dm Villasenor) is involved in the pregnancy and is 23 years old.      Last Menstrual Period: 2024  Ultrasound: Measured 8 weeks 6 days on 2024  Estimated Date of Delivery: 2025 confirmed by US    Signs/Symptoms of Pregnancy  Current pregnancy symptoms: nausea, rare vomiting, breast tenderness, urinary frequency, fatigue  Constipation YES - increase hydration, Miralax, Colace prn  Headaches YES (not migraine-type) - recommend Tylenol prn, increase hydration  Cramping/spotting YES - menstrual type cramping, no vaginal bleeding  PICA cravings no    Diabetes-  There is no height or weight on file to calculate BMI.  If patient has 1 or more, please order early 1 hour GTT  History of GDM no  BMI >35 no  History of PCOS or current metformin use no  History of LGA/macrosomic infant (4000g/9lbs) no    If patient has 2 or more, please order early 1 hour GTT  BMI>30 no  AMA no  First degree relative with type 2 diabetes YES (her mom)  History of chronic HTN, hyperlipidemia, elevated A1C no  High risk race (, , ,  or ) no    Hypertension- if you answer yes to any of the following, please order baseline preeclampsia labs (cbc, comprehensive metabolic panel, urine protein creatinine ratio, uric acid)  History of of chronic HTN no  History of gestational HTN no  History of preeclampsia, eclampsia, or HELLP syndrome no  History of diabetes no  History of lupus,sjogrens syndrome, kidney disease no    Thyroid- if yes order TSH with reflex T4  History of thyroid disease YES - recent increased dosage to 150 mcg/day & will have labs repeated in 4 wks per pcp - has lab order    Bleeding Disorder or Hx of DVT-patient or first degree relative with history of. Order the following if not done  previously.   (Factor V, antithrombin III, prothrombin gene mutation, protein C and S Ag, lupus anticoagulant, anticardiolipin, beta-2 glycoprotein)   no    OB/GYN-  History of abnormal pap smear no       Date of last pap smear 2023 (wnl - no endocervical component)  History of HPV no  History of Herpes/HSV no  History of other STI (gonorrhea, chlamydia, trich) no  History of prior  no  History of prior  no  History of  delivery prior to 36 weeks 6 days no  History of Varicella or Vaccination patient thinks she had vaccine in childhood  History of blood transfusion no  Ok for blood transfusion YES    Substance screening-   History of tobacco use no  Currently using tobacco no  Substance Use Screen Level (N/A, LOW, HIGH) n/a    MRSA Screening-   Does the pt have a hx of MRSA? no    Immunizations:  Influenza vaccine given this season NO - recommended in pregnancy  Discussed Tdap vaccine YES  Discussed COVID Vaccine - no previous Covid vaccine, patient had Covid x 1    Genetic/MFM-  Do you or your partner have a history of any of the following in yourselves or first degree relatives?  Cystic fibrosis no  Spinal muscular atrophy no  Hemoglobinopathy/Sickle Cell/Thalassemia no  Fragile X Intellectual Disability no    If yes, discuss Carrier Screening and recommend consultation with [facility]/Genetic Counseling and place specific [facility] Referral for.    If no, discuss Carrier Screening being completed once in a lifetime as a standard of care lab test. Place orders for Cystic Fibrosis Gene Test (UVQ235) and Spinal Muscular Atrophy DNA ([id number])      Appointment for Nuchal Translucency Ultrasound at [facility] scheduled for 2024 - discussed NIPT/ MSAFP      Interview education  [facility] Pregnancy Essentials Book reviewed, discussed and attached to their AVS YES    Nurse/Family Partnership- patient may qualify; referral placed     Prenatal lab work scripts YES ([facility])  Extra labs ordered:  Had recent thyroid  labs done 11/22/2024 - will have repeated in 4 wks thru pcp    Aspirin/Preeclampsia Screen    Risk Level Risk Factor Recommendation   LOW Prior Uncomplicated full-term delivery no No Aspirin recommendation        MODERATE Nulliparity no Recommend low-dose aspirin if     BMI>30 no 2 or more moderate risk factors    Family History Preeclampsia (mother/sister) no     35yr old or greater no     Black Race, Concern for SDOH/Low Socioeconomic no     IVF Pregnancy  no     Personal History Risks (low birth weight, prior adverse preg outcome, >10yr preg interval) no         HIGH History of Preeclampsia no Recommend low-dose aspirin if     Multifetal gestation no 1 or more high risk factors    Chronic HTN no     Type 1 or 2 Diabetes no     Renal Disease no     Autoimmune Disease  no      Contraindications to ASA therapy:  NSAID/ ASA allergy: no  Nasal polyps: no  Asthma with history of ASA induced bronchospasm: no  Relative contraindications:  History of GI bleed: no  Active peptic ulcer disease: no  Severe hepatic dysfunction: no    Patient should be recommended to take ASA 162mg during this pregnancy from 12-36wks to lower her risk of preeclampsia: NO RISK FACTORS per screening protocol          The patient has a history now or in prior pregnancy notable for:    - unplanned but welcomed pregnancy - conceived on Nuvaring  - hx celiac disease - 3/2023 - follows gluten free diet - reviewed dietary recommendations in pregnancy  - hx depression - sees counselor (Nathalie) q 2 wk - no meds previously or currently - EPDS today = 5          Details that I feel the provider should be aware of:   - recommended q 6 month dental cleanings - last cleaning approx 2 yrs ago per patient  - no travel plans during pregnancy  - patient works as  (middle school)      PN1 visit scheduled. The patient was oriented to our practice, the navigator role, reviewed delivering physicians and [facility] for Delivery. All questions  were answered.    Interviewed by: CHARY Castellano RN

## 2024-11-25 NOTE — PATIENT INSTRUCTIONS
Congratulation!! Please review our Pregnancy Essential Guide and [facility] L&D Virtual tour from our networks website.     [facility] Pregnancy Essentials Guide  [facility] ([email or web address])     [facility] - Virtual Tour ([employer])  Adela Calleanna Magdaleno,     It was so nice getting to know you today. Remember if you have an urgent or time sensitive concern, please call the practice phone number so a clinical triage team member can review your symptoms and get in touch with our on call provider if necessary. If you have general questions or need help navigating our services please REPLY to this message so it comes directly to me and I will respond between other patients. If I am out of the office for any reason, another nurse navigator may reach out to help you. Our Pregnancy Essential Guide is a great online resource--please use the link below.     [facility] Pregnancy Essentials Guide  [facility] ([email or web address])     Again, Congratulations and Thank You for choosing [facility]. I look forward to helping you through this journey. Reach out -   Ebony BURCH RN

## 2024-11-27 LAB
GAMMA INTERFERON BACKGROUND BLD IA-ACNC: 0.08 IU/ML
M TB IFN-G CD4+ T-CELLS BLD-ACNC: 0.08 IU/ML
M TB IFN-G CD4+ T-CELLS BLD-ACNC: 0.15 IU/ML
MITOGEN IGNF BLD-ACNC: >10 IU/ML
QUANTIFERON INCUBATION COMMENT: NORMAL
QUANTIFERON-TB GOLD PLUS: NEGATIVE
SERVICE CMNT-IMP: NORMAL

## 2024-12-02 ENCOUNTER — TELEPHONE (OUTPATIENT)
Age: 24
End: 2024-12-02

## 2024-12-02 NOTE — TELEPHONE ENCOUNTER
Patient would like to  a copy for the results of her Tb test. She will stop by the office 12/3 afternoon. Please advise back out to patient if needed.

## 2024-12-05 ENCOUNTER — INITIAL PRENATAL (OUTPATIENT)
Dept: OBGYN CLINIC | Facility: CLINIC | Age: 24
End: 2024-12-05
Payer: COMMERCIAL

## 2024-12-05 VITALS — BODY MASS INDEX: 27.11 KG/M2 | DIASTOLIC BLOOD PRESSURE: 62 MMHG | SYSTOLIC BLOOD PRESSURE: 92 MMHG | HEIGHT: 62 IN

## 2024-12-05 DIAGNOSIS — E07.9 THYROID DISEASE AFFECTING PREGNANCY: Primary | ICD-10-CM

## 2024-12-05 DIAGNOSIS — Z11.3 ROUTINE SCREENING FOR STI (SEXUALLY TRANSMITTED INFECTION): ICD-10-CM

## 2024-12-05 DIAGNOSIS — O99.280 THYROID DISEASE AFFECTING PREGNANCY: Primary | ICD-10-CM

## 2024-12-05 DIAGNOSIS — Z3A.12 12 WEEKS GESTATION OF PREGNANCY: ICD-10-CM

## 2024-12-05 DIAGNOSIS — F31.9 BIPOLAR 1 DISORDER (HCC): ICD-10-CM

## 2024-12-05 PROBLEM — N76.1 SUBACUTE VAGINITIS: Status: RESOLVED | Noted: 2024-06-24 | Resolved: 2024-12-05

## 2024-12-05 PROBLEM — Z11.59 NEED FOR HEPATITIS C SCREENING TEST: Status: RESOLVED | Noted: 2024-11-11 | Resolved: 2024-12-05

## 2024-12-05 PROBLEM — N76.0 BACTERIAL VAGINOSIS: Status: RESOLVED | Noted: 2023-05-19 | Resolved: 2024-12-05

## 2024-12-05 PROBLEM — Z78.9 USES VAGINAL CONTRACEPTIVE RING: Status: RESOLVED | Noted: 2023-10-24 | Resolved: 2024-12-05

## 2024-12-05 PROBLEM — Z30.09 CONTRACEPTIVE EDUCATION: Status: RESOLVED | Noted: 2023-05-19 | Resolved: 2024-12-05

## 2024-12-05 PROBLEM — Z13.6 SCREENING FOR CARDIOVASCULAR CONDITION: Status: RESOLVED | Noted: 2021-07-22 | Resolved: 2024-12-05

## 2024-12-05 PROBLEM — Z11.1 ENCOUNTER FOR PPD TEST: Status: RESOLVED | Noted: 2024-06-24 | Resolved: 2024-12-05

## 2024-12-05 PROBLEM — B96.89 BACTERIAL VAGINOSIS: Status: RESOLVED | Noted: 2023-05-19 | Resolved: 2024-12-05

## 2024-12-05 LAB
EXTERNAL CHLAMYDIA SCREEN: NORMAL
EXTERNAL GONORRHEA SCREEN: NORMAL
SL AMB  POCT GLUCOSE, UA: NORMAL
SL AMB POCT URINE PROTEIN: NORMAL

## 2024-12-05 PROCEDURE — 81002 URINALYSIS NONAUTO W/O SCOPE: CPT | Performed by: PHYSICIAN ASSISTANT

## 2024-12-05 PROCEDURE — PNV: Performed by: PHYSICIAN ASSISTANT

## 2024-12-05 NOTE — ASSESSMENT & PLAN NOTE
MARCUSM appt scheduled for tomorrow.   STD cultures done today. Pap up to date.   Patient has scripts for initial prenatal labs. Encouraged to get done prior to next visit.   Continue routine prenatal care.   Return to office for ob check in 4 weeks.

## 2024-12-05 NOTE — PROGRESS NOTES
"Routine Prenatal Visit  [facility] - [address], [address], [place] [address]    Assessment/Plan:  Sultana is a 24 y.o. year old  at 12w6d who presents for routine prenatal visit.     1. Thyroid disease affecting pregnancy  Assessment & Plan:  Hypothyroidism, managed by PCP.   TSH: 2024 8.590 Free T4: 1.09 PCP increased to Levothyroxine 150mcg. Has script to repeat in 4 weeks  2. 12 weeks gestation of pregnancy  Assessment & Plan:  MFM appt scheduled for tomorrow.   STD cultures done today. Pap up to date.   Patient has scripts for initial prenatal labs. Encouraged to get done prior to next visit.   Continue routine prenatal care.   Return to office for ob check in 4 weeks.     Orders:  -     POCT urine dip  3. Bipolar 1 disorder (HCC)  Assessment & Plan:  Follows with counselor in [place].Not on any medications. Currently stable.       Next OB Visit 4 weeks.    Subjective:     CC: Prenatal care    Sultana Dolan is a 24 y.o.  female who presents for routine prenatal care at 12w6d.  Pregnancy ROS: No FM, N/V, HA, cramping, VB, LOF, edema, domestic violence, or smoking. Tolerating PNV.    TSH: 2024 8.590 Free T4: 1.09 PCP increased to Levothyroxine 150mcg. Has script to repeat in 4 weeks.     The following portions of the patient's history were reviewed and updated as appropriate: allergies, current medications, past family history, past medical history, obstetric history, gynecologic history, past social history, past surgical history and problem list.      Objective:  BP 92/62 (BP Location: Left arm, Patient Position: Sitting, Cuff Size: Standard)   Ht 5' 2\" (1.575 m)   LMP 2024 (Exact Date)   BMI 27.11 kg/m²   Pregravid Weight/BMI: 65.8 kg (145 lb) (BMI 26.51)  Current Weight:     Total Weight Gain: 1.452 kg (3 lb 3.2 oz)   Pre-Yarelis Vitals      Flowsheet Row Most Recent Value   Prenatal Assessment    Fetal Heart Rate 150   Fundal Height (cm) 13 cm "   Movement Absent   Prenatal Vitals    Blood Pressure 92/62   Weight - Scale --  [Both scales wouldn't turn on]   Urine Albumin/Glucose    Dilation/Effacement/Station    Vaginal Drainage    Edema    LLE Edema None   RLE Edema None   Facial Edema None             Physical Exam  Constitutional:       Appearance: Normal appearance. She is well-developed.   Neck:      Thyroid: No thyroid mass or thyromegaly.   Cardiovascular:      Rate and Rhythm: Normal rate and regular rhythm.      Heart sounds: Normal heart sounds. No murmur heard.     No friction rub. No gallop.   Pulmonary:      Effort: Pulmonary effort is normal. No respiratory distress.      Breath sounds: Normal breath sounds. No wheezing or rales.   Chest:   Breasts:     Right: Normal. No swelling, bleeding, inverted nipple, mass, nipple discharge, skin change or tenderness.      Left: Normal. No swelling, bleeding, inverted nipple, mass, nipple discharge, skin change or tenderness.   Abdominal:      General: Bowel sounds are normal. There is no distension.      Palpations: Abdomen is soft. There is no mass.      Tenderness: There is no abdominal tenderness. There is no guarding or rebound.   Genitourinary:     Labia:         Right: No rash, tenderness, lesion or injury.         Left: No rash, tenderness, lesion or injury.       Urethra: No prolapse, urethral pain, urethral swelling or urethral lesion.      Vagina: No signs of injury. No vaginal discharge, erythema, tenderness or bleeding.      Cervix: No cervical motion tenderness, discharge or friability.      Uterus: Not deviated, not enlarged and not tender.       Adnexa:         Right: No mass, tenderness or fullness.          Left: No mass, tenderness or fullness.     Musculoskeletal:      Cervical back: Neck supple.   Lymphadenopathy:      Cervical: No cervical adenopathy.      Upper Body:      Right upper body: No supraclavicular or axillary adenopathy.      Left upper body: No supraclavicular or  axillary adenopathy.   Skin:     General: Skin is warm and dry.      Coloration: Skin is not pale.      Findings: No erythema or rash.   Neurological:      Mental Status: She is alert and oriented to person, place, and time.   Psychiatric:         Behavior: Behavior normal.         Thought Content: Thought content normal.         Judgment: Judgment normal.

## 2024-12-05 NOTE — ASSESSMENT & PLAN NOTE
Hypothyroidism, managed by PCP.   TSH: 11/22/2024 8.590 Free T4: 1.09 PCP increased to Levothyroxine 150mcg. Has script to repeat in 4 weeks

## 2024-12-06 ENCOUNTER — ROUTINE PRENATAL (OUTPATIENT)
Dept: PERINATAL CARE | Facility: OTHER | Age: 24
End: 2024-12-06
Payer: COMMERCIAL

## 2024-12-06 VITALS
HEART RATE: 71 BPM | BODY MASS INDEX: 27.82 KG/M2 | DIASTOLIC BLOOD PRESSURE: 58 MMHG | HEIGHT: 62 IN | WEIGHT: 151.2 LBS | SYSTOLIC BLOOD PRESSURE: 100 MMHG

## 2024-12-06 DIAGNOSIS — F32.A DEPRESSION COMPLICATING PREGNANCY, ANTEPARTUM, FIRST TRIMESTER: ICD-10-CM

## 2024-12-06 DIAGNOSIS — O99.341 BIPOLAR DISEASE DURING PREGNANCY IN FIRST TRIMESTER (HCC): ICD-10-CM

## 2024-12-06 DIAGNOSIS — Z36.82 ENCOUNTER FOR ANTENATAL SCREENING FOR NUCHAL TRANSLUCENCY: Primary | ICD-10-CM

## 2024-12-06 DIAGNOSIS — O99.281 HYPOTHYROIDISM IN PREGNANCY, ANTEPARTUM, FIRST TRIMESTER: ICD-10-CM

## 2024-12-06 DIAGNOSIS — E03.9 HYPOTHYROIDISM IN PREGNANCY, ANTEPARTUM, FIRST TRIMESTER: ICD-10-CM

## 2024-12-06 DIAGNOSIS — O99.341 DEPRESSION COMPLICATING PREGNANCY, ANTEPARTUM, FIRST TRIMESTER: ICD-10-CM

## 2024-12-06 DIAGNOSIS — Z3A.08 8 WEEKS GESTATION OF PREGNANCY: ICD-10-CM

## 2024-12-06 DIAGNOSIS — Z3A.13 13 WEEKS GESTATION OF PREGNANCY: ICD-10-CM

## 2024-12-06 DIAGNOSIS — F31.9 BIPOLAR DISEASE DURING PREGNANCY IN FIRST TRIMESTER (HCC): ICD-10-CM

## 2024-12-06 PROCEDURE — 76813 OB US NUCHAL MEAS 1 GEST: CPT | Performed by: OBSTETRICS & GYNECOLOGY

## 2024-12-06 PROCEDURE — 99203 OFFICE O/P NEW LOW 30 MIN: CPT | Performed by: OBSTETRICS & GYNECOLOGY

## 2024-12-06 PROCEDURE — 76801 OB US < 14 WKS SINGLE FETUS: CPT | Performed by: OBSTETRICS & GYNECOLOGY

## 2024-12-06 NOTE — LETTER
December 6, 2024     Wellington Ballesteros MD  [address]  [address]  [facility] [address]    Patient: Sultana Dolan   YOB: 2000   Date of Visit: 12/6/2024       Dear Dr. Ballesteros:    Thank you for referring Sultana Dolan to me for evaluation. Below are my notes for this consultation.    If you have questions, please do not hesitate to call me. I look forward to following your patient along with you.         Sincerely,        Jarred Nelson MD        CC: No Recipients    Jarred Nelson MD  12/6/2024  2:54 PM  Sign when Signing Visit  Please refer to the [facility] ultrasound report in Ob Procedures for additional information regarding today's visit

## 2024-12-09 ENCOUNTER — RESULTS FOLLOW-UP (OUTPATIENT)
Dept: OBGYN CLINIC | Facility: CLINIC | Age: 24
End: 2024-12-09

## 2024-12-09 ENCOUNTER — TELEPHONE (OUTPATIENT)
Dept: PERINATAL CARE | Facility: OTHER | Age: 24
End: 2024-12-09

## 2024-12-09 LAB
C TRACH RRNA SPEC QL NAA+PROBE: NEGATIVE
N GONORRHOEA RRNA SPEC QL NAA+PROBE: NEGATIVE

## 2024-12-20 ENCOUNTER — RESULTS FOLLOW-UP (OUTPATIENT)
Dept: OBGYN CLINIC | Facility: CLINIC | Age: 24
End: 2024-12-20

## 2024-12-20 ENCOUNTER — ROUTINE PRENATAL (OUTPATIENT)
Dept: OBGYN CLINIC | Facility: CLINIC | Age: 24
End: 2024-12-20
Payer: COMMERCIAL

## 2024-12-20 ENCOUNTER — NURSE TRIAGE (OUTPATIENT)
Age: 24
End: 2024-12-20

## 2024-12-20 VITALS
DIASTOLIC BLOOD PRESSURE: 56 MMHG | SYSTOLIC BLOOD PRESSURE: 100 MMHG | BODY MASS INDEX: 27.75 KG/M2 | WEIGHT: 150.8 LBS | HEIGHT: 62 IN

## 2024-12-20 DIAGNOSIS — E03.9 ACQUIRED HYPOTHYROIDISM: ICD-10-CM

## 2024-12-20 DIAGNOSIS — Z3A.15 15 WEEKS GESTATION OF PREGNANCY: Primary | ICD-10-CM

## 2024-12-20 DIAGNOSIS — B37.31 VAGINAL YEAST INFECTION: ICD-10-CM

## 2024-12-20 PROCEDURE — 87210 SMEAR WET MOUNT SALINE/INK: CPT | Performed by: OBSTETRICS & GYNECOLOGY

## 2024-12-20 PROCEDURE — PNV: Performed by: OBSTETRICS & GYNECOLOGY

## 2024-12-20 RX ORDER — TERCONAZOLE 4 MG/G
1 CREAM VAGINAL
Qty: 45 G | Refills: 0 | Status: SHIPPED | OUTPATIENT
Start: 2024-12-20

## 2024-12-20 NOTE — PROGRESS NOTES
"Routine Prenatal Visit  [facility] - [address], [address], [place] [address]    Assessment/Plan:  Sultana is a 24 y.o. year old  at 15w0d who presents for routine prenatal visit.     1. 15 weeks gestation of pregnancy  -     terconazole (TERAZOL 7) 0.4 % vaginal cream; Insert 1 applicator into the vagina daily at bedtime  2. Acquired hypothyroidism  -     terconazole (TERAZOL 7) 0.4 % vaginal cream; Insert 1 applicator into the vagina daily at bedtime  3. Vaginal yeast infection  -     terconazole (TERAZOL 7) 0.4 % vaginal cream; Insert 1 applicator into the vagina daily at bedtime  -     POCT wet mount    Pt w burning w relations over the past week  + rectal  relations one week ago. .   No bleeding.  NO odor to dc    Wet mount  + hyphae and  budding.      Subjective:     CC: Prenatal care    Sultana Dolan is a 24 y.o.  female who presents for routine prenatal care at 15w0d.  Pregnancy ROS: no  leakage of fluid, pelvic pain, or vaginal bleeding.  +  fetal movement.    The following portions of the patient's history were reviewed and updated as appropriate: allergies, current medications, past family history, past medical history, obstetric history, gynecologic history, past social history, past surgical history and problem list.      Objective:  /56 (BP Location: Left arm, Patient Position: Sitting, Cuff Size: Standard)   Ht 5' 2\" (1.575 m)   Wt 68.4 kg (150 lb 12.8 oz)   LMP 2024 (Exact Date)   BMI 27.58 kg/m²   Pregravid Weight/BMI: 65.8 kg (145 lb) (BMI 26.51)  Current Weight: 68.4 kg (150 lb 12.8 oz)   Total Weight Gain: 2.631 kg (5 lb 12.8 oz)   Pre- Vitals    Flowsheet Row Most Recent Value   Prenatal Assessment    Fetal Heart Rate 154   Fundal Height (cm) 15 cm   Movement Absent   Prenatal Vitals    Blood Pressure 100/56   Weight - Scale 68.4 kg (150 lb 12.8 oz)   Urine Albumin/Glucose    Dilation/Effacement/Station    Vaginal Drainage    Draining " Fluid No   Edema    LLE Edema None   RLE Edema None   Facial Edema None           General: Well appearing, no distress  Respiratory: Unlabored breathing  Cardiovascular: Regular rate.  Abdomen: Soft, gravid, nontender  Fundal Height: Appropriate for gestational age.  Extremities: Warm and well perfused.  Non tender.

## 2024-12-20 NOTE — PATIENT INSTRUCTIONS
Open to air  no  under  wear to bed at night. Limit sugar intake.  Use the  cream  for at least 5 nights

## 2024-12-30 ENCOUNTER — ROUTINE PRENATAL (OUTPATIENT)
Dept: OBGYN CLINIC | Facility: CLINIC | Age: 24
End: 2024-12-30
Payer: COMMERCIAL

## 2024-12-30 VITALS
HEIGHT: 62 IN | DIASTOLIC BLOOD PRESSURE: 62 MMHG | SYSTOLIC BLOOD PRESSURE: 108 MMHG | BODY MASS INDEX: 27.97 KG/M2 | WEIGHT: 152 LBS

## 2024-12-30 DIAGNOSIS — E03.9 ACQUIRED HYPOTHYROIDISM: ICD-10-CM

## 2024-12-30 DIAGNOSIS — F31.9 BIPOLAR 1 DISORDER (HCC): ICD-10-CM

## 2024-12-30 DIAGNOSIS — Z3A.16 16 WEEKS GESTATION OF PREGNANCY: Primary | ICD-10-CM

## 2024-12-30 LAB
EXTERNAL ANTIBODY SCREEN: NORMAL
EXTERNAL HEMOGLOBIN: 11.2 G/DL
EXTERNAL HEPATITIS B SURFACE ANTIGEN: NEGATIVE
EXTERNAL HIV-1/2 AB-AG: NORMAL
EXTERNAL PLATELET COUNT: 158 K/ΜL
EXTERNAL RH FACTOR: POSITIVE
EXTERNAL SYPHILIS TOTAL IGG/IGM SCREENING: NORMAL
SL AMB  POCT GLUCOSE, UA: NORMAL
SL AMB POCT URINE PROTEIN: NORMAL

## 2024-12-30 PROCEDURE — PNV: Performed by: OBSTETRICS & GYNECOLOGY

## 2024-12-30 PROCEDURE — 81002 URINALYSIS NONAUTO W/O SCOPE: CPT | Performed by: OBSTETRICS & GYNECOLOGY

## 2024-12-30 NOTE — PROGRESS NOTES
"Routine Prenatal Visit  [facility] - [address], [address], [place] [address]    Assessment/Plan:  Sultana is a 24 y.o. year old  at 16w3d who presents for routine prenatal visit.     1. 16 weeks gestation of pregnancy  -     POCT urine dip  2. Bipolar 1 disorder (HCC)  3. Acquired hypothyroidism     Labs   to include thyroid screen and  initial prenatals  done this am.  Declines genetic screening  .       Subjective:     CC: Prenatal care    Sultana Dolan is a 24 y.o.  female who presents for routine prenatal care at 16w3d.  Pregnancy ROS: no  leakage of fluid, pelvic pain, or vaginal bleeding.  No  fetal movement.    The following portions of the patient's history were reviewed and updated as appropriate: allergies, current medications, past family history, past medical history, obstetric history, gynecologic history, past social history, past surgical history and problem list.      Objective:  /62 (BP Location: Right arm, Patient Position: Sitting, Cuff Size: Standard)   Ht 5' 2\" (1.575 m)   Wt 68.9 kg (152 lb)   LMP 2024 (Exact Date)   BMI 27.80 kg/m²   Pregravid Weight/BMI: 65.8 kg (145 lb) (BMI 26.51)  Current Weight: 68.9 kg (152 lb)   Total Weight Gain: 3.175 kg (7 lb)   Pre-Yarelis Vitals    Flowsheet Row Most Recent Value   Prenatal Assessment    Fetal Heart Rate 154   Fundal Height (cm) 16 cm   Movement Absent   Prenatal Vitals    Blood Pressure 108/62   Weight - Scale 68.9 kg (152 lb)   Urine Albumin/Glucose    Dilation/Effacement/Station    Vaginal Drainage    Draining Fluid No   Edema    LLE Edema None   RLE Edema None   Facial Edema None           General: Well appearing, no distress  Respiratory: Unlabored breathing  Cardiovascular: Regular rate.  Abdomen: Soft, gravid, nontender  Fundal Height: Appropriate for gestational age.  Extremities: Warm and well perfused.  Non tender.  "

## 2024-12-31 ENCOUNTER — RESULTS FOLLOW-UP (OUTPATIENT)
Dept: INTERNAL MEDICINE CLINIC | Facility: CLINIC | Age: 24
End: 2024-12-31

## 2024-12-31 DIAGNOSIS — E03.9 ACQUIRED HYPOTHYROIDISM: Primary | ICD-10-CM

## 2024-12-31 LAB
ALBUMIN SERPL-MCNC: 3.8 G/DL (ref 4–5)
ALP SERPL-CCNC: 60 IU/L (ref 44–121)
ALT SERPL-CCNC: 14 IU/L (ref 0–32)
AST SERPL-CCNC: 19 IU/L (ref 0–40)
BILIRUB SERPL-MCNC: <0.2 MG/DL (ref 0–1.2)
BUN SERPL-MCNC: 9 MG/DL (ref 6–20)
BUN/CREAT SERPL: 14 (ref 9–23)
CALCIUM SERPL-MCNC: 8.7 MG/DL (ref 8.7–10.2)
CHLORIDE SERPL-SCNC: 103 MMOL/L (ref 96–106)
CO2 SERPL-SCNC: 19 MMOL/L (ref 20–29)
CREAT SERPL-MCNC: 0.65 MG/DL (ref 0.57–1)
EGFR: 128 ML/MIN/1.73
GLOBULIN SER-MCNC: 2.2 G/DL (ref 1.5–4.5)
GLUCOSE SERPL-MCNC: 75 MG/DL (ref 70–99)
HCV AB S/CO SERPL IA: NON REACTIVE
POTASSIUM SERPL-SCNC: 3.7 MMOL/L (ref 3.5–5.2)
PROT SERPL-MCNC: 6 G/DL (ref 6–8.5)
SODIUM SERPL-SCNC: 139 MMOL/L (ref 134–144)
T4 FREE SERPL-MCNC: 1.32 NG/DL (ref 0.82–1.77)
TSH SERPL DL<=0.005 MIU/L-ACNC: 7.52 UIU/ML (ref 0.45–4.5)

## 2024-12-31 RX ORDER — LEVOTHYROXINE SODIUM 175 UG/1
175 TABLET ORAL
Qty: 100 TABLET | Refills: 3 | Status: SHIPPED | OUTPATIENT
Start: 2024-12-31

## 2025-01-02 ENCOUNTER — RESULTS FOLLOW-UP (OUTPATIENT)
Dept: OBGYN CLINIC | Facility: CLINIC | Age: 25
End: 2025-01-02

## 2025-01-02 LAB
ABO GROUP BLD: ABNORMAL
APPEARANCE UR: ABNORMAL
BACTERIA UR CULT: ABNORMAL
BACTERIA UR CULT: NO GROWTH
BACTERIA URNS QL MICRO: ABNORMAL
BASOPHILS # BLD AUTO: 0 X10E3/UL (ref 0–0.2)
BASOPHILS NFR BLD AUTO: 0 %
BILIRUB UR QL STRIP: NEGATIVE
BLD GP AB SCN SERPL QL: NEGATIVE
C TRACH RRNA SPEC QL NAA+PROBE: NEGATIVE
CASTS URNS QL MICRO: ABNORMAL /LPF
COLOR UR: YELLOW
EOSINOPHIL # BLD AUTO: 0.1 X10E3/UL (ref 0–0.4)
EOSINOPHIL NFR BLD AUTO: 1 %
EPI CELLS #/AREA URNS HPF: >10 /HPF (ref 0–10)
ERYTHROCYTE [DISTWIDTH] IN BLOOD BY AUTOMATED COUNT: 13 % (ref 11.7–15.4)
GLUCOSE UR QL: NEGATIVE
HBV SURFACE AG SERPL QL IA: NEGATIVE
HCT VFR BLD AUTO: 34 % (ref 34–46.6)
HCV AB S/CO SERPL IA: NON REACTIVE
HGB BLD-MCNC: 11.2 G/DL (ref 11.1–15.9)
HGB UR QL STRIP: NEGATIVE
HIV 1+2 AB+HIV1 P24 AG SERPL QL IA: NON REACTIVE
IMM GRANULOCYTES # BLD: 0 X10E3/UL (ref 0–0.1)
IMM GRANULOCYTES NFR BLD: 0 %
KETONES UR QL STRIP: NEGATIVE
LEUKOCYTE ESTERASE UR QL STRIP: NEGATIVE
LYMPHOCYTES # BLD AUTO: 1.3 X10E3/UL (ref 0.7–3.1)
LYMPHOCYTES NFR BLD AUTO: 14 %
MCH RBC QN AUTO: 29.5 PG (ref 26.6–33)
MCHC RBC AUTO-ENTMCNC: 32.9 G/DL (ref 31.5–35.7)
MCV RBC AUTO: 90 FL (ref 79–97)
MICRO URNS: ABNORMAL
MICRO URNS: ABNORMAL
MONOCYTES # BLD AUTO: 0.5 X10E3/UL (ref 0.1–0.9)
MONOCYTES NFR BLD AUTO: 5 %
N GONORRHOEA RRNA SPEC QL NAA+PROBE: NEGATIVE
NEUTROPHILS # BLD AUTO: 7.5 X10E3/UL (ref 1.4–7)
NEUTROPHILS NFR BLD AUTO: 80 %
NITRITE UR QL STRIP: NEGATIVE
PH UR STRIP: 7.5 [PH] (ref 5–7.5)
PLATELET # BLD AUTO: 158 X10E3/UL (ref 150–450)
PROT UR QL STRIP: ABNORMAL
RBC # BLD AUTO: 3.8 X10E6/UL (ref 3.77–5.28)
RBC #/AREA URNS HPF: ABNORMAL /HPF (ref 0–2)
RH BLD: POSITIVE
RPR SER QL: NON REACTIVE
RUBV IGG SERPL IA-ACNC: 3.43 INDEX
SL AMB INTERPRETATION: NORMAL
SP GR UR: 1.02 (ref 1–1.03)
UROBILINOGEN UR STRIP-ACNC: 0.2 MG/DL (ref 0.2–1)
WBC # BLD AUTO: 9.5 X10E3/UL (ref 3.4–10.8)
WBC #/AREA URNS HPF: ABNORMAL /HPF (ref 0–5)

## 2025-01-09 LAB
CITATION REF LAB TEST: NORMAL
ETHNIC BACKGROUND STATED: NORMAL
GENE DIS ANL CARRIER INTERP-IMP: NORMAL
GENE STUDIED ID: NORMAL
LAB DIRECTOR NAME PROVIDER: NORMAL
LABORATORY COMMENT REPORT: NORMAL
Lab: NORMAL
REASON FOR REFERRAL (NARRATIVE): NORMAL
RECOMMENDATION PATIENT DOC-IMP: NORMAL
REF LAB TEST METHOD: NORMAL
SMN1 GENE MUT ANL BLD/T: NORMAL
SPECIMEN PREPARATION: NORMAL

## 2025-01-16 ENCOUNTER — TELEPHONE (OUTPATIENT)
Dept: OBGYN CLINIC | Facility: CLINIC | Age: 25
End: 2025-01-16

## 2025-01-16 NOTE — TELEPHONE ENCOUNTER
Second Trimester Calls:  Attempt to call; left message on vm, and my chart message sent.     Overall how are you doing?     Compliant with routine OB care appointments? Yes    Have you completed your 1st trimester labs? Yes    If you had NIPS with MFM, do you have a order for MSAFP? Declined genetic testing   Can be completed 15w-22w6d, ideally 16w-18w    Have you seen MFM and do you have your detailed US scheduled? 1/24/25    Pregnancy Education-have you had a chance to review the classes offered and registered?

## 2025-01-22 ENCOUNTER — TELEPHONE (OUTPATIENT)
Age: 25
End: 2025-01-22

## 2025-01-22 NOTE — TELEPHONE ENCOUNTER
"Pt calling in stating that she's experiencing \"extreme burning sensation\" with intercourse. Pt is 19w5d . Pt denies having vaginal itching, discharge, vaginal bleeding.       Pt is also looking to reschedule her appt on 25, pt's appt time was changed from 1045 to 2pm still with the provider of Dr Gonzalez.   "

## 2025-01-23 NOTE — TELEPHONE ENCOUNTER
Hi  I would  hold off  on relations until her visit with Dr Gonzalez.  Open to air , no under wear to bed at night and  report any bleeding.

## 2025-01-23 NOTE — TELEPHONE ENCOUNTER
Pt was contacted and notified of provider's recommendations, Pt verbalized understanding, no further questions at this time

## 2025-01-27 ENCOUNTER — ROUTINE PRENATAL (OUTPATIENT)
Dept: OBGYN CLINIC | Facility: CLINIC | Age: 25
End: 2025-01-27
Payer: COMMERCIAL

## 2025-01-27 ENCOUNTER — ROUTINE PRENATAL (OUTPATIENT)
Dept: PERINATAL CARE | Facility: OTHER | Age: 25
End: 2025-01-27
Payer: COMMERCIAL

## 2025-01-27 VITALS
SYSTOLIC BLOOD PRESSURE: 102 MMHG | DIASTOLIC BLOOD PRESSURE: 60 MMHG | WEIGHT: 155.8 LBS | HEIGHT: 62 IN | BODY MASS INDEX: 28.67 KG/M2

## 2025-01-27 VITALS
BODY MASS INDEX: 28.49 KG/M2 | DIASTOLIC BLOOD PRESSURE: 62 MMHG | WEIGHT: 154.8 LBS | SYSTOLIC BLOOD PRESSURE: 104 MMHG | HEIGHT: 62 IN | HEART RATE: 70 BPM

## 2025-01-27 DIAGNOSIS — O99.280 THYROID DISEASE AFFECTING PREGNANCY: Primary | ICD-10-CM

## 2025-01-27 DIAGNOSIS — E07.9 THYROID DISEASE AFFECTING PREGNANCY: Primary | ICD-10-CM

## 2025-01-27 DIAGNOSIS — Z36.86 ENCOUNTER FOR ANTENATAL SCREENING FOR CERVICAL LENGTH: ICD-10-CM

## 2025-01-27 DIAGNOSIS — Z36.3 ENCOUNTER FOR ANTENATAL SCREENING FOR MALFORMATION: ICD-10-CM

## 2025-01-27 DIAGNOSIS — Z3A.20 20 WEEKS GESTATION OF PREGNANCY: ICD-10-CM

## 2025-01-27 PROBLEM — Z98.890 S/P ACL RECONSTRUCTION: Status: RESOLVED | Noted: 2019-12-17 | Resolved: 2025-01-27

## 2025-01-27 LAB
SL AMB  POCT GLUCOSE, UA: NORMAL
SL AMB POCT URINE PROTEIN: NORMAL

## 2025-01-27 PROCEDURE — 76805 OB US >/= 14 WKS SNGL FETUS: CPT | Performed by: OBSTETRICS & GYNECOLOGY

## 2025-01-27 PROCEDURE — 81002 URINALYSIS NONAUTO W/O SCOPE: CPT | Performed by: STUDENT IN AN ORGANIZED HEALTH CARE EDUCATION/TRAINING PROGRAM

## 2025-01-27 PROCEDURE — 76817 TRANSVAGINAL US OBSTETRIC: CPT | Performed by: OBSTETRICS & GYNECOLOGY

## 2025-01-27 PROCEDURE — 99213 OFFICE O/P EST LOW 20 MIN: CPT | Performed by: OBSTETRICS & GYNECOLOGY

## 2025-01-27 PROCEDURE — PNV: Performed by: STUDENT IN AN ORGANIZED HEALTH CARE EDUCATION/TRAINING PROGRAM

## 2025-01-27 NOTE — PROGRESS NOTES
Sultana Dolan  has no complaints today at 20w3d. She reports fetal movements and does not report any vaginal bleeding or signs of labor.  Her recently completed fetal testing revealed a no evidence for carrier state for CF and SMA.  Did not complete any other genetic screening or MSAFP in this pregnancy. She is here today for an ultrasound for fetal anatomy.  She is not interested in knowing with the fetal gender is.     Problem list:  Hypothyroidism with elevated TSH levels managed through her PCP office currently now on 175 mcg of levothyroxine which was increased from her baseline because of a TSH of 7.52 and she has follow-up blood work through her PCP office to recheck in 1 month.    Ultrasound findings:  The ultrasound today shows normal interval fetal growth and fluid, normal cervical length, and no malformations were detected.    Pregnancy ultrasound has limitations and is unable to detect all forms of fetal congenital abnormalities.      Follow up recommended:   Recommend a follow-up ultrasound at 32 weeks for growth given her history of maternal hypothyroidism.    Pre visit time reviewing her records   5 minutes  Face to face time 5 minutes  Post visit time on documentation of note, updating her problem list, adding orders and prescriptions 5 minutes.  Procedures that were completed today were charged separately.   The level of decision making was straight forward.    Elly De La Cruz MD

## 2025-01-27 NOTE — PROGRESS NOTES
Ultrasound Probe Disinfection    A transvaginal ultrasound was performed.   Prior to use, disinfection was performed with High Level Disinfection Process ([employer]).  Probe serial number [id number] was used.    Adamaris Jackman  01/27/25  11:06 AM

## 2025-01-27 NOTE — LETTER
January 27, 2025     Jevon Gonzalez MD  [address]  [address]  [facility] [address]    Patient: Sultana Dolan   YOB: 2000   Date of Visit: 1/27/2025       Dear Dr. Gonzalez:    Thank you for referring Sultana Dolan to me for evaluation. Below are my notes for this consultation.    If you have questions, please do not hesitate to call me. I look forward to following your patient along with you.         Sincerely,        Elly De La Cruz MD        CC: No Recipients    Elly De La Cruz MD  1/27/2025 12:27 PM  Sign when Signing Visit  Sultana Dolan  has no complaints today at 20w3d. She reports fetal movements and does not report any vaginal bleeding or signs of labor.  Her recently completed fetal testing revealed a no evidence for carrier state for CF and SMA.  Did not complete any other genetic screening or MSAFP in this pregnancy. She is here today for an ultrasound for fetal anatomy.  She is not interested in knowing with the fetal gender is.     Problem list:  Hypothyroidism with elevated TSH levels managed through her PCP office currently now on 175 mcg of levothyroxine which was increased from her baseline because of a TSH of 7.52 and she has follow-up blood work through her PCP office to recheck in 1 month.    Ultrasound findings:  The ultrasound today shows normal interval fetal growth and fluid, normal cervical length, and no malformations were detected.    Pregnancy ultrasound has limitations and is unable to detect all forms of fetal congenital abnormalities.      Follow up recommended:   Recommend a follow-up ultrasound at 32 weeks for growth given her history of maternal hypothyroidism.    Pre visit time reviewing her records   5 minutes  Face to face time 5 minutes  Post visit time on documentation of note, updating her problem list, adding orders and prescriptions 5 minutes.  Procedures that were completed today were charged separately.   The level of decision making was  straight forward.    Elly De La Cruz MD

## 2025-01-27 NOTE — PROGRESS NOTES
"Routine Prenatal Visit  [facility] - [place]  [address] [place] [address]  Assessment & Plan  Thyroid disease affecting pregnancy  - Continue management per PCP. Repeat TSH has been ordered and is due this week.        20 weeks gestation of pregnancy  - PTL/PPROM/Bleeding precautions given.   - MFM consult report from level II ultrasound reviewed. Repeat US is scheduled in 3rd trimester, per MFM recommendations.  - Problem list updated, results console reviewed and updated with pertinent prenatal labs.  - PMH, PSH, medications reviewed and updated as needed.  - Return to office in 4 wk(s) for routine prenatal care.    Orders:  •  POCT urine dip    Subjective:   Sultana Dolan is a 24 y.o.  who presents for routine prenatal care at 20w3d.  Complaints today: Pelvic pain with intercourse  LOF: No; VB: No; Contractions: No; FM: Flutters    Objective:  /60 (BP Location: Left arm, Patient Position: Sitting, Cuff Size: Standard)   Ht 5' 2\" (1.575 m)   Wt 70.7 kg (155 lb 12.8 oz)   LMP 2024 (Exact Date)   BMI 28.50 kg/m²     General: Well appearing, no distress  Respiratory: Unlabored breathing  Cardiovascular: Regular rate.  Abdomen: Soft, gravid, nontender  Extremities: Warm and well perfused.  Non tender.    Pregravid Weight/BMI: 65.8 kg (145 lb) (BMI 26.51)  Current Weight: 70.7 kg (155 lb 12.8 oz)   Total Weight Gain: 4.899 kg (10 lb 12.8 oz)     Pre-Yarelis Vitals    Flowsheet Row Most Recent Value   Prenatal Assessment    Fetal Heart Rate 145   Fundal Height (cm) 20 cm   Movement Present   Prenatal Vitals    Blood Pressure 102/60   Weight - Scale 70.7 kg (155 lb 12.8 oz)   Urine Albumin/Glucose    Dilation/Effacement/Station    Vaginal Drainage    Draining Fluid No   Edema    LLE Edema None   RLE Edema None   Facial Edema None           Jevon Gonzalez MD  2025 2:35 PM   "

## 2025-01-27 NOTE — ASSESSMENT & PLAN NOTE
- PTL/PPROM/Bleeding precautions given.   - MFM consult report from level II ultrasound reviewed. Repeat US is scheduled in 3rd trimester, per [facility] recommendations.  - Problem list updated, results console reviewed and updated with pertinent prenatal labs.  - PMH, PSH, medications reviewed and updated as needed.  - Return to office in 4 wk(s) for routine prenatal care.    Orders:  •  POCT urine dip

## 2025-02-07 ENCOUNTER — TELEPHONE (OUTPATIENT)
Dept: INTERNAL MEDICINE CLINIC | Facility: CLINIC | Age: 25
End: 2025-02-07

## 2025-02-07 ENCOUNTER — RESULTS FOLLOW-UP (OUTPATIENT)
Dept: INTERNAL MEDICINE CLINIC | Facility: CLINIC | Age: 25
End: 2025-02-07

## 2025-02-07 DIAGNOSIS — E03.9 ACQUIRED HYPOTHYROIDISM: Primary | ICD-10-CM

## 2025-02-07 LAB
T4 FREE SERPL-MCNC: 1.24 NG/DL (ref 0.82–1.77)
TSH SERPL DL<=0.005 MIU/L-ACNC: 1.29 UIU/ML (ref 0.45–4.5)

## 2025-02-24 ENCOUNTER — ROUTINE PRENATAL (OUTPATIENT)
Dept: OBGYN CLINIC | Facility: CLINIC | Age: 25
End: 2025-02-24
Payer: COMMERCIAL

## 2025-02-24 VITALS
BODY MASS INDEX: 28.89 KG/M2 | DIASTOLIC BLOOD PRESSURE: 62 MMHG | HEIGHT: 62 IN | WEIGHT: 157 LBS | SYSTOLIC BLOOD PRESSURE: 100 MMHG

## 2025-02-24 DIAGNOSIS — O99.280 THYROID DISEASE AFFECTING PREGNANCY: Primary | ICD-10-CM

## 2025-02-24 DIAGNOSIS — Z36.89 ENCOUNTER FOR OTHER SPECIFIED ANTENATAL SCREENING: ICD-10-CM

## 2025-02-24 DIAGNOSIS — Z3A.24 24 WEEKS GESTATION OF PREGNANCY: ICD-10-CM

## 2025-02-24 DIAGNOSIS — E07.9 THYROID DISEASE AFFECTING PREGNANCY: Primary | ICD-10-CM

## 2025-02-24 LAB
SL AMB  POCT GLUCOSE, UA: NORMAL
SL AMB POCT URINE PROTEIN: NORMAL

## 2025-02-24 PROCEDURE — 81002 URINALYSIS NONAUTO W/O SCOPE: CPT | Performed by: OBSTETRICS & GYNECOLOGY

## 2025-02-24 PROCEDURE — PNV: Performed by: OBSTETRICS & GYNECOLOGY

## 2025-02-24 NOTE — PROGRESS NOTES
"Routine Prenatal Visit  [facility] [address], [address], [place] [address]    Assessment/Plan:  Sultana is a 24 y.o. year old  at 24w3d who presents for routine prenatal visit.     1. Thyroid disease affecting pregnancy  Assessment & Plan:  Most recent TSH wnl, repeat next week    2. Encounter for other specified  screening  -     POCT urine dip  -     Glucose, 1H PG; Future  -     CBC; Future  -     RPR, Rfx Qn RPR/Confirm TP; Future  -     Glucose, 1H PG  -     CBC  -     RPR, Rfx Qn RPR/Confirm TP  3. 24 weeks gestation of pregnancy        Subjective:   Sultana Dolan is a 24 y.o.  who presents for routine prenatal care at 24w3d.  Complaints today: Denies  LOF: -; VB: -; Contractions: -; FM: +    Objective:  /62 (BP Location: Left arm, Patient Position: Sitting, Cuff Size: Standard)   Ht 5' 2\" (1.575 m)   Wt 71.2 kg (157 lb)   LMP 2024 (Exact Date)   BMI 28.72 kg/m²     General: Well appearing, no distress  Respiratory: Unlabored breathing  Abdomen: Soft, gravid, nontender  Extremities: Warm and well perfused.  Non tender.    Pregravid Weight/BMI: 65.8 kg (145 lb) (BMI 26.51)  Current Weight: 71.2 kg (157 lb)   Total Weight Gain: 5.443 kg (12 lb)     Pre-Yarelis Vitals      Flowsheet Row Most Recent Value   Prenatal Assessment    Fetal Heart Rate 150   Movement Present   Prenatal Vitals    Blood Pressure 100/62   Weight - Scale 71.2 kg (157 lb)   Urine Albumin/Glucose    Dilation/Effacement/Station    Vaginal Drainage    Edema              Shil V Cwalinski, DO  2025 6:05 PM     "

## 2025-03-15 LAB
T4 FREE SERPL-MCNC: 1.3 NG/DL (ref 0.82–1.77)
TSH SERPL DL<=0.005 MIU/L-ACNC: 0.22 UIU/ML (ref 0.45–4.5)

## 2025-03-17 ENCOUNTER — RESULTS FOLLOW-UP (OUTPATIENT)
Dept: INTERNAL MEDICINE CLINIC | Facility: CLINIC | Age: 25
End: 2025-03-17

## 2025-03-17 DIAGNOSIS — E03.9 ACQUIRED HYPOTHYROIDISM: Primary | ICD-10-CM

## 2025-03-17 RX ORDER — LEVOTHYROXINE SODIUM 150 UG/1
150 TABLET ORAL
Qty: 100 TABLET | Refills: 3 | Status: SHIPPED | OUTPATIENT
Start: 2025-03-17

## 2025-03-22 LAB
GLUCOSE 1H P 50 G GLC PO SERPL-MCNC: 173 MG/DL (ref 70–139)
RPR SER QL: NON REACTIVE

## 2025-03-24 ENCOUNTER — RESULTS FOLLOW-UP (OUTPATIENT)
Dept: LABOR AND DELIVERY | Facility: HOSPITAL | Age: 25
End: 2025-03-24

## 2025-03-24 ENCOUNTER — ROUTINE PRENATAL (OUTPATIENT)
Dept: OBGYN CLINIC | Facility: CLINIC | Age: 25
End: 2025-03-24

## 2025-03-24 VITALS
BODY MASS INDEX: 30.73 KG/M2 | DIASTOLIC BLOOD PRESSURE: 58 MMHG | SYSTOLIC BLOOD PRESSURE: 98 MMHG | WEIGHT: 167 LBS | HEIGHT: 62 IN

## 2025-03-24 DIAGNOSIS — E07.9 THYROID DISEASE AFFECTING PREGNANCY: ICD-10-CM

## 2025-03-24 DIAGNOSIS — O99.810 ABNORMAL GLUCOSE AFFECTING PREGNANCY: ICD-10-CM

## 2025-03-24 DIAGNOSIS — O99.810 ABNORMAL GLUCOSE AFFECTING PREGNANCY: Primary | ICD-10-CM

## 2025-03-24 DIAGNOSIS — Z3A.28 28 WEEKS GESTATION OF PREGNANCY: Primary | ICD-10-CM

## 2025-03-24 DIAGNOSIS — O99.280 THYROID DISEASE AFFECTING PREGNANCY: ICD-10-CM

## 2025-03-24 DIAGNOSIS — Z36.89 ENCOUNTER FOR OTHER SPECIFIED ANTENATAL SCREENING: ICD-10-CM

## 2025-03-24 PROCEDURE — PNV: Performed by: OBSTETRICS & GYNECOLOGY

## 2025-03-24 NOTE — PROGRESS NOTES
"Routine Prenatal Visit  [facility] - [address], [address], [place] [address]    Assessment/Plan:  Sultana is a 24 y.o. year old  at 28w3d who presents for routine prenatal visit.     Assessment & Plan  Thyroid disease affecting pregnancy         28 weeks gestation of pregnancy      -  has not had CBC drawn since 2024.  Order for CBC provided       Abnormal glucose affecting pregnancy     -  3 hr GTT ordered       Encounter for other specified  screening    Orders:    CBC; Future      Next OB Visit 2 weeks.    Subjective:     CC: Prenatal care    Sultana Dolan is a 24 y.o.  female who presents for routine prenatal care at 28w3d.  Pregnancy ROS: no leakage of fluid, pelvic pain, or vaginal bleeding.  normal fetal movement.    The following portions of the patient's history were reviewed and updated as appropriate: allergies, current medications, past family history, past medical history, obstetric history, gynecologic history, past social history, past surgical history and problem list.      Objective:  BP 98/58 (BP Location: Right arm, Patient Position: Sitting, Cuff Size: Standard)   Ht 5' 2\" (1.575 m)   Wt 75.8 kg (167 lb)   LMP 2024 (Exact Date)   BMI 30.54 kg/m²   Pregravid Weight/BMI: 65.8 kg (145 lb) (BMI 26.51)  Current Weight: 75.8 kg (167 lb)   Total Weight Gain: 9.979 kg (22 lb)   Pre-Yarelis Vitals      Flowsheet Row Most Recent Value   Prenatal Assessment    Fetal Heart Rate 140-150   Fundal Height (cm) 28 cm   Movement Present   Prenatal Vitals    Blood Pressure 98/58   Weight - Scale 75.8 kg (167 lb)   Urine Albumin/Glucose    Dilation/Effacement/Station    Vaginal Drainage    Draining Fluid No   Edema              General: Well appearing, no distress  Abdomen: Soft, gravid, nontender  Extremities: Non tender.  "

## 2025-03-25 LAB
DME PARACHUTE DELIVERY DATE REQUESTED: NORMAL
DME PARACHUTE ITEM DESCRIPTION: NORMAL
DME PARACHUTE ORDER STATUS: NORMAL
DME PARACHUTE SUPPLIER NAME: NORMAL
DME PARACHUTE SUPPLIER PHONE: NORMAL

## 2025-03-27 LAB
EXTERNAL HEMOGLOBIN: 10.4 G/DL
EXTERNAL PLATELET COUNT: 153 K/ÂΜL
EXTERNAL SYPHILIS TOTAL IGG/IGM SCREENING: NORMAL

## 2025-03-28 ENCOUNTER — RESULTS FOLLOW-UP (OUTPATIENT)
Dept: OBGYN CLINIC | Facility: CLINIC | Age: 25
End: 2025-03-28

## 2025-03-28 LAB
ERYTHROCYTE [DISTWIDTH] IN BLOOD BY AUTOMATED COUNT: 11.5 % (ref 11.7–15.4)
GLUCOSE 1H P 100 G GLC PO SERPL-MCNC: 130 MG/DL (ref 70–179)
GLUCOSE 2H P 100 G GLC PO SERPL-MCNC: 101 MG/DL (ref 70–154)
GLUCOSE 3H P 100 G GLC PO SERPL-MCNC: 101 MG/DL (ref 70–139)
GLUCOSE P FAST SERPL-MCNC: 68 MG/DL (ref 70–94)
HCT VFR BLD AUTO: 31.9 % (ref 34–46.6)
HGB BLD-MCNC: 10.4 G/DL (ref 11.1–15.9)
MCH RBC QN AUTO: 28.6 PG (ref 26.6–33)
MCHC RBC AUTO-ENTMCNC: 32.6 G/DL (ref 31.5–35.7)
MCV RBC AUTO: 88 FL (ref 79–97)
PLATELET # BLD AUTO: 153 X10E3/UL (ref 150–450)
RBC # BLD AUTO: 3.64 X10E6/UL (ref 3.77–5.28)
SL AMB NOTE:: ABNORMAL
WBC # BLD AUTO: 10.8 X10E3/UL (ref 3.4–10.8)

## 2025-03-30 ENCOUNTER — RESULTS FOLLOW-UP (OUTPATIENT)
Dept: LABOR AND DELIVERY | Facility: HOSPITAL | Age: 25
End: 2025-03-30

## 2025-03-30 PROBLEM — D50.8 IRON DEFICIENCY ANEMIA SECONDARY TO INADEQUATE DIETARY IRON INTAKE: Status: ACTIVE | Noted: 2025-03-30

## 2025-04-04 ENCOUNTER — TELEPHONE (OUTPATIENT)
Dept: OBGYN CLINIC | Facility: CLINIC | Age: 25
End: 2025-04-04

## 2025-04-04 NOTE — TELEPHONE ENCOUNTER
Third Trimester Call (30-34 weeks)     Overall how are you feeling? She stated that overall she been feeling great. Baby is moving. Denies LOF/vaginal bleeding/contractions.     Compliant with routine OB appointments? Yes    Have you completed your 3rd trimester lab work? yes    Have you reviewed the contents of 3rd trimester folder from office?    Have you decided on a pediatrician? No, not yet because she is currently moving and once settled, she will look for a pediatrician.     If yes, who     If no, reviewed practices and transferred call to [phone] to set up appointment with pediatric office.     Questions on paperwork to go back to office? no   Questions on the baby birth certificate and photography forms? no      Send link for the Hospital Readiness Video via [employer]

## 2025-04-07 ENCOUNTER — ROUTINE PRENATAL (OUTPATIENT)
Dept: OBGYN CLINIC | Facility: CLINIC | Age: 25
End: 2025-04-07
Payer: COMMERCIAL

## 2025-04-07 ENCOUNTER — CLINICAL SUPPORT (OUTPATIENT)
Dept: POSTPARTUM | Facility: CLINIC | Age: 25
End: 2025-04-07

## 2025-04-07 VITALS
HEIGHT: 62 IN | WEIGHT: 170 LBS | DIASTOLIC BLOOD PRESSURE: 56 MMHG | SYSTOLIC BLOOD PRESSURE: 98 MMHG | BODY MASS INDEX: 31.28 KG/M2

## 2025-04-07 DIAGNOSIS — Z3A.30 30 WEEKS GESTATION OF PREGNANCY: Primary | ICD-10-CM

## 2025-04-07 DIAGNOSIS — O99.280 THYROID DISEASE AFFECTING PREGNANCY: ICD-10-CM

## 2025-04-07 DIAGNOSIS — Z32.2 ENCOUNTER FOR CHILDBIRTH INSTRUCTION: Primary | ICD-10-CM

## 2025-04-07 DIAGNOSIS — E07.9 THYROID DISEASE AFFECTING PREGNANCY: ICD-10-CM

## 2025-04-07 DIAGNOSIS — O99.810 ABNORMAL GLUCOSE AFFECTING PREGNANCY: ICD-10-CM

## 2025-04-07 DIAGNOSIS — D50.8 IRON DEFICIENCY ANEMIA SECONDARY TO INADEQUATE DIETARY IRON INTAKE: ICD-10-CM

## 2025-04-07 LAB
SL AMB  POCT GLUCOSE, UA: NORMAL
SL AMB POCT URINE PROTEIN: NORMAL

## 2025-04-07 PROCEDURE — PNV: Performed by: OBSTETRICS & GYNECOLOGY

## 2025-04-07 PROCEDURE — 81002 URINALYSIS NONAUTO W/O SCOPE: CPT | Performed by: OBSTETRICS & GYNECOLOGY

## 2025-04-07 RX ORDER — FERROUS SULFATE 325(65) MG
325 TABLET ORAL
COMMUNITY

## 2025-04-07 NOTE — PROGRESS NOTES
"Routine Prenatal Visit  [facility] - [address], [address], [place] [address]    Assessment/Plan:  Sultana is a 24 y.o. year old  at 30w3d who presents for routine prenatal visit.     1. 30 weeks gestation of pregnancy  -     POCT urine dip  2. Thyroid disease affecting pregnancy  Assessment & Plan:  3/17 0.224  decrease  levothyroxine  to  150 mg  rep;eat in  4 weeks   3. Abnormal glucose affecting pregnancy  Comments:  normal  3 hour  4. Iron deficiency anemia secondary to inadequate dietary iron intake  Comments:  feosol added  dw pt   colace    + fm  no utcx  +  has  32 week growth scan  scheduled .  3 hour  glucose wnl .  Adding   feososl one daily .     Subjective:     CC: Prenatal care    Sultana Dolan is a 24 y.o.  female who presents for routine prenatal care at 30w3d.  Pregnancy ROS: no  leakage of fluid, pelvic pain, or vaginal bleeding.  +  fetal movement.    The following portions of the patient's history were reviewed and updated as appropriate: allergies, current medications, past family history, past medical history, obstetric history, gynecologic history, past social history, past surgical history and problem list.      Objective:  BP 98/56 (BP Location: Left arm, Patient Position: Sitting, Cuff Size: Standard)   Ht 5' 2\" (1.575 m)   Wt 77.1 kg (170 lb)   LMP 2024 (Exact Date)   BMI 31.09 kg/m²   Pregravid Weight/BMI: 65.8 kg (145 lb) (BMI 26.51)  Current Weight: 77.1 kg (170 lb)   Total Weight Gain: 11.3 kg (25 lb)   Pre- Vitals    Flowsheet Row Most Recent Value   Prenatal Assessment    Fetal Heart Rate 137   Fundal Height (cm) 31 cm   Movement Present   Prenatal Vitals    Blood Pressure 98/56   Weight - Scale 77.1 kg (170 lb)   Urine Albumin/Glucose    Dilation/Effacement/Station    Vaginal Drainage    Draining Fluid No   Edema    LLE Edema None   RLE Edema None   Facial Edema None           General: Well appearing, no distress  Respiratory: " Unlabored breathing  Cardiovascular: Regular rate.  Abdomen: Soft, gravid, nontender  Fundal Height: Appropriate for gestational age.  Extremities: Warm and well perfused.  Non tender.

## 2025-04-07 NOTE — PATIENT INSTRUCTIONS
The Third Trimester  (28-42 weeks)  YOUR BABY   * your baby sucks its thumb now!   * your baby can hear voices and respond to touch…..so talk to him or her!!   * your baby’s brain grows and develops most in the last 2 months of pregnancy   * baby’s head and bones are soft and flexible so they can fit through the birth canal   * baby’s movements change towards the end of pregnancy because there is less room for kicking and stretching in your belly   * baby’s lungs are not fully developed and completely ready to breathe on their own until the last 3-4 weeks before your due date    YOUR BODY   * your belly is growing a lot now   * it may become more difficult to sleep well at night or to be as active as you usually are   * you may sweat more than usual   * you will become more off-balance……be careful not to fall!!   * you may develop hemorrhoids (which can be painful and make it difficult to sit down)   * the last two months of pregnancy can become very uncomfortable……with backaches, headaches, and heartburn   * you can start to have contractions…….as long as they are irregular and less than 5 per hour, this is a normal part of your body getting ready to have a baby   * your cervix may start to thin out and open up……to get ready for delivery   * you may find yourself needing to “pee” very often…….because baby is pressing on your bladder so much   * you may get out of breathe more quickly than usual      FETAL KICK COUNTS    In the third trimester (after 28 weeks gestation) you should be performing fetal kick counts every day.  Your baby should move at least 10 times in 2 hours during an active time, once a day.    Choose atime of day when your baby is most active.  Try to do this around the same time each day.  Get into a comfortable position and then write down the time your baby first moves.  Count each movement until the baby moves 10 times.  These movements include kicks, punches, nudges, flutters, or rolls.  This  can take anywhere from 5 minutes to 2 hours.  Write down the time you feel the baby's 10th movement.    If 2 hours has passed and your baby has not moved at least 10 times, you should CALL THE OFFICE RIGHT AWAY.  [phone]          PREMATURE LABOR     When to call [phone]:  * I need to call immediately if I have even a small amount of LIQUID leaking from my vagina, with or without contractions.   * I need to call if I am BLEEDING from my vagina.   * I need to call if I am feeling CRAMPING that continues after drinking 2-3 glasses of water and lying down on my side for one hour and that feels like I am having a period.   * I need to call if I feel CONTRACTIONS  more than 4 times in an hour that feels like the baby is “balling up” even after I try drinking 2-3 glasses of water and lying down on my side for an hour.   * I need to call if I notice a change in my vaginal DISCHARGE.   * I need to call if I am feeling PELVIC PRESSURE  that feels like the baby is pushing down into my vagina and lasts more than an hour.   * I need to call if I have LOW BACKACHE which is new and near my tailbone.  It may either come and go several times during an hour or stay there constantly.          PRE-ECLAMPSIA     What is it?   Pre-eclampsia is a serious disease that can occur during pregnancy related to high blood pressures.  It can happen to any woman.     Why should I care?   Women who develop pre-eclampsia have serious risks which can include seizures, stroke, organ damage, premature birth of their baby.  In the very worst cases, it can cause death of the mother and/or their baby.     What should I pay attention to?   Signs and symptoms of pre-eclampsia can include:   * Severe swelling of face or hands    * A headache that will not go away even after you have taken Tylenol   * Seeing spots or changes in eyesight    * Pain in the upper abdomen or shoulder    * New nausea and vomiting (in the second half of pregnancy)    *  Sudden weight gain    * Difficulty breathing     What should I do?   If you experience any of the above symptoms of pre-eclampsia, contact your OB provider.  Finding pre-eclampsia early is important for you and your baby.  Call us       BREASTFEEDING     BENEFITS FOR BABIES   * stronger immune systems (less allergies, eczema, asthma, and childhood cancers)   * less diarrhea and constipation or other GI diseases   * fewer colds and ear infections   * better vision and teeth (fewer cavities)   * improves IQ   * lower rates of diabetes and obesity in childhood     BENEFITS FOR MOMS   * promotes faster weight loss after delivery   * lower risk for postpartum depression   * lower risk for breast, uterine, and ovarian cancers   * lower risk for osteoporosis developing with age   * easier than formula - is always right with you, clean, and the right temperature   * less expensive than formula……it’s FREE !!!!     KEYS TO SUCCESSFUL BREASTFEEDING   * keep baby skin-to-skin until after first feeding event   * keep baby in your room with you during your hospital stay after delivery   * avoid any bottle feedings (unless medically necessary)   * limit the use of pacifiers and swaddling   * ask for help if you are having any issues……lactation consultants (who specialize in breastfeeding) are available to help you   * a healthy diet for mom……eating a variety of foods and portions in moderation    THINGS YOU SHOULD KNOW ABOUT BREASTFEEDING   * most medications are considered compatible with breastfeeding by the American Academy of Pediatrics, but you should check with your health care provider or lactation consultant prior to taking a new medication……just to be sure it is safe   * alcohol (beer, wine, liquor) can be passed from mother to baby through breast milk……an occasional, social drink is deemed acceptable by the American Academy of Pediatrics…..more than that should be avoided   * breastfeeding is NOT an effective method of  birth control   * nicotine (in cigarettes) can pass from mother to baby through breast milk…..however, for mothers who smoke, it is still healthier to breastfeed than use formula   * caffeine should be limited to 1-3 cups per day……includes coffee, soda, energy drinks         PERINEAL / VAGINAL MASSAGE    What can I do now to decrease my chances of tearing during delivery?  Massaging around the vaginal opening by you (or your partner), either antepartum (before birth) or during the second stage of labor, can reduce the likelihood of perineal tearing during childbirth.  Likewise, the use of warm packs held on the perineum during the pushing stage of labor can reduce the severity of your tear.  This will happen during the pushing stage of labor.  At home, you can also help reduce the chances of injury that may occur during the birth of your child through perineal massage.    When should I do this?  Starting around or shortly after 34 weeks of pregnancy, you or your partner should provide 5-10 minutes of vaginal massage 1-4 times per week.    How?  Use either almond, coconut, or olive oil and water mixture on 1 or 2 fingers (depending on comfort).  Insert finger(s) 3-5cm into the vagina.  Apply sweeping downward/sideward pressure from 3 to 9 o'clock for 5-10 minutes, 1-4 times per week.          WARNING SIGNS DURING PREGNANCY  Call our office at [phone] if you experience any of the followin. Vaginal bleeding  2. Sharp abdominal pain that does not go away  3. Fever (more than 100.4 and is not relieved by Tylenol)  4. Persistent vomiting lasting greater than 24 hours  5. Chest pain   6. Pain or burning when you urinate  7. Severe headache that doesn't resolve with Tylenol  8. Blurred vision or seeing spots in your vision  9. Sudden swelling of your face or hands  10. Redness, swelling or pain in a leg  11. A sudden weight gain in just a few days  12. Decrease in your baby's movement (after 28 weeks or the 6th  month of pregnancy)  13. A loss of watery fluid from your vagina - can be a gush, a trickle or continuous wetness  14. After 20 weeks of pregnancy, rhythmic cramping (greater than 4 per hour) or menstrual like low/pelvic pain          VACCINES IN PREGNANCY    TDAP  Whopping cough (or pertusSsis) can be serious for anyone, but for your , it can be life-threatning.  Up to 20 babies die each year in the [place] Due to whopping cough.  About half of babies younger than 1 year old who get whopping cough need treatment in the hospital.  The younger the baby is when he or she gets whopping cough, the more likely he or she will need to be treated in a hospital.  When you receive the whopping cough vaccine (Tdap) during your pregnancy, your body will create protective antibodies and pass some of them to your baby before birth.  These antibodies can help protect your baby from getting whopping cough until they are old enough to be vaccinated themselves (usually around 6 months of age).    INFLUENZA  Changes in your immune, heart, and lung functions during pregnancy make you more likely to get seriously ill from the flu.  Catching the flu also increases your chances for serious problems for your developing baby, including premature labor and delivery.  It is recommended that all women who are pregnant during flu season should receive an influenza vaccine.

## 2025-04-18 ENCOUNTER — ULTRASOUND (OUTPATIENT)
Dept: PERINATAL CARE | Facility: OTHER | Age: 25
End: 2025-04-18
Attending: OBSTETRICS & GYNECOLOGY
Payer: COMMERCIAL

## 2025-04-18 ENCOUNTER — RESULTS FOLLOW-UP (OUTPATIENT)
Dept: INTERNAL MEDICINE CLINIC | Facility: CLINIC | Age: 25
End: 2025-04-18

## 2025-04-18 VITALS
HEIGHT: 62 IN | DIASTOLIC BLOOD PRESSURE: 68 MMHG | BODY MASS INDEX: 31.69 KG/M2 | HEART RATE: 75 BPM | WEIGHT: 172.2 LBS | SYSTOLIC BLOOD PRESSURE: 112 MMHG

## 2025-04-18 DIAGNOSIS — Z3A.31 31 WEEKS GESTATION OF PREGNANCY: ICD-10-CM

## 2025-04-18 DIAGNOSIS — Z36.89 ENCOUNTER FOR ULTRASOUND TO ASSESS FETAL GROWTH: ICD-10-CM

## 2025-04-18 DIAGNOSIS — O36.63X0 MACROSOMIA OF FETUS AFFECTING MANAGEMENT OF MOTHER IN THIRD TRIMESTER, SINGLE OR UNSPECIFIED FETUS: Primary | ICD-10-CM

## 2025-04-18 DIAGNOSIS — E03.9 HYPOTHYROID IN PREGNANCY, ANTEPARTUM, THIRD TRIMESTER: ICD-10-CM

## 2025-04-18 DIAGNOSIS — O99.283 HYPOTHYROID IN PREGNANCY, ANTEPARTUM, THIRD TRIMESTER: ICD-10-CM

## 2025-04-18 LAB
T4 FREE SERPL-MCNC: 1.18 NG/DL (ref 0.82–1.77)
TSH SERPL DL<=0.005 MIU/L-ACNC: 0.32 UIU/ML (ref 0.45–4.5)

## 2025-04-18 PROCEDURE — 76816 OB US FOLLOW-UP PER FETUS: CPT | Performed by: OBSTETRICS & GYNECOLOGY

## 2025-04-18 NOTE — PROGRESS NOTES
Sultana Dolan is here today at 32w0d for an ultrasound for fetal growth. Elevated 1h 173 (on 3/21). Passed 3h on 3/27.    Problem list:  Hypothyroidism on levothyroxine.  Most recent TSH is 0.320 on 4/17/2025 with normal free T4 of 1.18.  Her thyroid levels are followed through her PCP.    Ultrasound findings:  The ultrasound today shows a fetus who is in the 93rd percentile and appears symmetric.  A normal FLAVIA is seen.    Pregnancy ultrasound has limitations and is unable to detect all forms of fetal congenital abnormalities.  The inaccuracy in the EFW can be off by 1 lb either way in the third trimester.    Follow up recommended:   Recommend a growth scan in 5 weeks given the fetus measuring large for gestational age.      Elly De La Cruz MD

## 2025-04-18 NOTE — LETTER
April 18, 2025     Wellington Ballesteros MD  [address]  [address]  [facility] [address]    Patient: Sultana Dolan   YOB: 2000   Date of Visit: 4/18/2025       Dear Dr. Wellington Ballesteros MD:    Thank you for referring Sultana Dolna to me for evaluation. Below are my notes for this consultation.    If you have questions, please do not hesitate to call me. I look forward to following your patient along with you.         Sincerely,        Elly De La Cruz MD        CC: No Recipients    Elly De La Cruz MD  4/18/2025  6:05 PM  Sign when Signing Visit  Sultana Dolan is here today at 32w0d for an ultrasound for fetal growth. Elevated 1h 173 (on 3/21). Passed 3h on 3/27.    Problem list:  Hypothyroidism on levothyroxine.  Most recent TSH is 0.320 on 4/17/2025 with normal free T4 of 1.18.  Her thyroid levels are followed through her PCP.    Ultrasound findings:  The ultrasound today shows a fetus who is in the 93rd percentile and appears symmetric.  A normal FLAVIA is seen.    Pregnancy ultrasound has limitations and is unable to detect all forms of fetal congenital abnormalities.  The inaccuracy in the EFW can be off by 1 lb either way in the third trimester.    Follow up recommended:   Recommend a growth scan in 5 weeks given the fetus measuring large for gestational age.      Elly De La Cruz MD

## 2025-04-23 ENCOUNTER — ROUTINE PRENATAL (OUTPATIENT)
Dept: OBGYN CLINIC | Facility: CLINIC | Age: 25
End: 2025-04-23
Payer: COMMERCIAL

## 2025-04-23 VITALS
DIASTOLIC BLOOD PRESSURE: 64 MMHG | WEIGHT: 174 LBS | SYSTOLIC BLOOD PRESSURE: 110 MMHG | HEIGHT: 62 IN | BODY MASS INDEX: 32.02 KG/M2

## 2025-04-23 DIAGNOSIS — O99.280 THYROID DISEASE AFFECTING PREGNANCY: ICD-10-CM

## 2025-04-23 DIAGNOSIS — Z23 NEED FOR TDAP VACCINATION: ICD-10-CM

## 2025-04-23 DIAGNOSIS — Z3A.32 32 WEEKS GESTATION OF PREGNANCY: Primary | ICD-10-CM

## 2025-04-23 DIAGNOSIS — E07.9 THYROID DISEASE AFFECTING PREGNANCY: ICD-10-CM

## 2025-04-23 DIAGNOSIS — D50.8 IRON DEFICIENCY ANEMIA SECONDARY TO INADEQUATE DIETARY IRON INTAKE: ICD-10-CM

## 2025-04-23 LAB
SL AMB  POCT GLUCOSE, UA: NORMAL
SL AMB POCT URINE PROTEIN: NORMAL

## 2025-04-23 PROCEDURE — 90715 TDAP VACCINE 7 YRS/> IM: CPT | Performed by: OBSTETRICS & GYNECOLOGY

## 2025-04-23 PROCEDURE — 81002 URINALYSIS NONAUTO W/O SCOPE: CPT | Performed by: OBSTETRICS & GYNECOLOGY

## 2025-04-23 PROCEDURE — 90471 IMMUNIZATION ADMIN: CPT | Performed by: OBSTETRICS & GYNECOLOGY

## 2025-04-23 PROCEDURE — PNV: Performed by: OBSTETRICS & GYNECOLOGY

## 2025-05-05 ENCOUNTER — ROUTINE PRENATAL (OUTPATIENT)
Dept: OBGYN CLINIC | Facility: CLINIC | Age: 25
End: 2025-05-05
Payer: COMMERCIAL

## 2025-05-05 VITALS
BODY MASS INDEX: 32.94 KG/M2 | WEIGHT: 179 LBS | SYSTOLIC BLOOD PRESSURE: 102 MMHG | DIASTOLIC BLOOD PRESSURE: 58 MMHG | HEIGHT: 62 IN

## 2025-05-05 DIAGNOSIS — Z3A.20 20 WEEKS GESTATION OF PREGNANCY: ICD-10-CM

## 2025-05-05 DIAGNOSIS — E07.9 THYROID DISEASE AFFECTING PREGNANCY: ICD-10-CM

## 2025-05-05 DIAGNOSIS — D50.8 IRON DEFICIENCY ANEMIA SECONDARY TO INADEQUATE DIETARY IRON INTAKE: ICD-10-CM

## 2025-05-05 DIAGNOSIS — O99.810 ABNORMAL GLUCOSE AFFECTING PREGNANCY: ICD-10-CM

## 2025-05-05 DIAGNOSIS — O99.280 THYROID DISEASE AFFECTING PREGNANCY: ICD-10-CM

## 2025-05-05 DIAGNOSIS — Z3A.34 34 WEEKS GESTATION OF PREGNANCY: Primary | ICD-10-CM

## 2025-05-05 LAB
SL AMB  POCT GLUCOSE, UA: NORMAL
SL AMB POCT URINE PROTEIN: NORMAL

## 2025-05-05 PROCEDURE — 81002 URINALYSIS NONAUTO W/O SCOPE: CPT | Performed by: OBSTETRICS & GYNECOLOGY

## 2025-05-05 PROCEDURE — PNV: Performed by: OBSTETRICS & GYNECOLOGY

## 2025-05-05 NOTE — PROGRESS NOTES
"Routine Prenatal Visit  [facility] - [address], [address], [place] [address]    Assessment/Plan:  Sultana is a 25 y.o. year old  at 34w3d who presents for routine prenatal visit.     Assessment & Plan  34 weeks gestation of pregnancy    Orders:    POCT urine dip    Thyroid disease affecting pregnancy         20 weeks gestation of pregnancy         Abnormal glucose affecting pregnancy         Iron deficiency anemia secondary to inadequate dietary iron intake           Next OB Visit 2 weeks.    Subjective:     CC: Prenatal care    Sultana Dolan is a 25 y.o.  female who presents for routine prenatal care at 34w3d.  Pregnancy ROS: no leakage of fluid, pelvic pain, or vaginal bleeding.  normal fetal movement.    The following portions of the patient's history were reviewed and updated as appropriate: allergies, current medications, past family history, past medical history, obstetric history, gynecologic history, past social history, past surgical history and problem list.      Objective:  /58 (BP Location: Left arm, Patient Position: Sitting, Cuff Size: Standard)   Ht 5' 2\" (1.575 m)   Wt 81.2 kg (179 lb)   LMP 2024 (Exact Date)   BMI 32.74 kg/m²   Pregravid Weight/BMI: 65.8 kg (145 lb) (BMI 26.51)  Current Weight: 81.2 kg (179 lb)   Total Weight Gain: 15.4 kg (34 lb)   Pre- Vitals      Flowsheet Row Most Recent Value   Prenatal Assessment    Fetal Heart Rate 140-150   Fundal Height (cm) 36 cm   Movement Present   Prenatal Vitals    Blood Pressure 102/58   Weight - Scale 81.2 kg (179 lb)   Urine Albumin/Glucose    Dilation/Effacement/Station    Vaginal Drainage    Draining Fluid No   Edema              General: Well appearing, no distress  Abdomen: Soft, gravid, nontender  Extremities: Non tender.  "

## 2025-05-11 DIAGNOSIS — E03.9 ACQUIRED HYPOTHYROIDISM: ICD-10-CM

## 2025-05-11 RX ORDER — LEVOTHYROXINE SODIUM 150 UG/1
150 TABLET ORAL EVERY MORNING
Qty: 90 TABLET | Refills: 1 | Status: SHIPPED | OUTPATIENT
Start: 2025-05-11

## 2025-05-14 LAB
DME PARACHUTE DELIVERY DATE ACTUAL: NORMAL
DME PARACHUTE DELIVERY DATE REQUESTED: NORMAL
DME PARACHUTE ITEM DESCRIPTION: NORMAL
DME PARACHUTE ORDER STATUS: NORMAL
DME PARACHUTE SUPPLIER NAME: NORMAL
DME PARACHUTE SUPPLIER PHONE: NORMAL

## 2025-05-15 NOTE — PROGRESS NOTES
"Routine Prenatal Visit  [facility] - [address], [address], [place] [address]    Assessment/Plan:  Sultana is a 25 y.o. year old  at 32w5d who presents for routine prenatal visit.     1. 32 weeks gestation of pregnancy  -     POCT urine dip  2. Thyroid disease affecting pregnancy  3. Iron deficiency anemia secondary to inadequate dietary iron intake  4. Need for Tdap vaccination  -     TDAP VACCINE GREATER THAN OR EQUAL TO 6YO IM        Subjective:     CC: Prenatal care    Sultana Dolan is a 25 y.o.  female who presents for routine prenatal care at 32w5d.  Pregnancy ROS: no leakage of fluid, pelvic pain, or vaginal bleeding.  normal fetal movement.    The following portions of the patient's history were reviewed and updated as appropriate: allergies, current medications, past family history, past medical history, obstetric history, gynecologic history, past social history, past surgical history and problem list.      Objective:  /64 (BP Location: Left arm, Patient Position: Sitting, Cuff Size: Standard)   Ht 5' 2\" (1.575 m)   Wt 78.9 kg (174 lb)   LMP 2024 (Exact Date)   BMI 31.83 kg/m²   Pregravid Weight/BMI: 65.8 kg (145 lb) (BMI 26.51)  Current Weight: 78.9 kg (174 lb)   Total Weight Gain: 13.2 kg (29 lb)   Pre-Yarelis Vitals      Flowsheet Row Most Recent Value   Prenatal Assessment    Fetal Heart Rate 140   Fundal Height (cm) 33 cm   Movement Present   Presentation Vertex   Prenatal Vitals    Blood Pressure 110/64   Weight - Scale 78.9 kg (174 lb)   Urine Albumin/Glucose    Dilation/Effacement/Station    Vaginal Drainage    Edema              General: Well appearing, no distress  Respiratory: Unlabored breathing  Cardiovascular: Regular rate.  Abdomen: Soft, gravid, nontender  Fundal Height: Appropriate for gestational age.  Extremities: Warm and well perfused.  Non tender.  "

## 2025-05-16 ENCOUNTER — RESULTS FOLLOW-UP (OUTPATIENT)
Dept: INTERNAL MEDICINE CLINIC | Facility: CLINIC | Age: 25
End: 2025-05-16

## 2025-05-16 LAB
T4 FREE SERPL-MCNC: 1.23 NG/DL (ref 0.82–1.77)
TSH SERPL DL<=0.005 MIU/L-ACNC: 0.32 UIU/ML (ref 0.45–4.5)

## 2025-05-20 PROBLEM — Z3A.36 36 WEEKS GESTATION OF PREGNANCY: Status: ACTIVE | Noted: 2024-12-30

## 2025-05-20 PROBLEM — O99.013 ANEMIA AFFECTING PREGNANCY IN THIRD TRIMESTER: Status: ACTIVE | Noted: 2025-03-30

## 2025-05-21 ENCOUNTER — ROUTINE PRENATAL (OUTPATIENT)
Dept: OBGYN CLINIC | Facility: CLINIC | Age: 25
End: 2025-05-21
Payer: COMMERCIAL

## 2025-05-21 VITALS
DIASTOLIC BLOOD PRESSURE: 64 MMHG | HEIGHT: 62 IN | WEIGHT: 183 LBS | BODY MASS INDEX: 33.68 KG/M2 | SYSTOLIC BLOOD PRESSURE: 106 MMHG

## 2025-05-21 DIAGNOSIS — Z3A.36 36 WEEKS GESTATION OF PREGNANCY: ICD-10-CM

## 2025-05-21 DIAGNOSIS — Z36.85 ANTENATAL SCREENING FOR STREPTOCOCCUS B: ICD-10-CM

## 2025-05-21 DIAGNOSIS — O99.280 THYROID DISEASE AFFECTING PREGNANCY: Primary | ICD-10-CM

## 2025-05-21 DIAGNOSIS — O99.013 ANEMIA AFFECTING PREGNANCY IN THIRD TRIMESTER: ICD-10-CM

## 2025-05-21 DIAGNOSIS — E07.9 THYROID DISEASE AFFECTING PREGNANCY: Primary | ICD-10-CM

## 2025-05-21 LAB
SL AMB  POCT GLUCOSE, UA: NORMAL
SL AMB POCT URINE PROTEIN: NORMAL

## 2025-05-21 PROCEDURE — PNV: Performed by: OBSTETRICS & GYNECOLOGY

## 2025-05-21 PROCEDURE — 81002 URINALYSIS NONAUTO W/O SCOPE: CPT | Performed by: OBSTETRICS & GYNECOLOGY

## 2025-05-21 NOTE — PROGRESS NOTES
"Routine Prenatal Visit  [facility] - [address], [address], [place] [address]    Assessment/Plan:  Sultana is a 25 y.o. year old  at 36w5d who presents for routine prenatal visit.     Assessment & Plan  Thyroid disease affecting pregnancy  Continue thyroid medicine       Anemia affecting pregnancy in third trimester  Continue iron        screening for streptococcus B    Orders:    Strep B DNA probe, amplification    36 weeks gestation of pregnancy    Orders:    POCT urine dip      Next OB Visit 1 weeks.    Subjective:     CC: Prenatal care    Sultana Dolan is a 25 y.o.  female who presents for routine prenatal care at 36w5d.  Pregnancy ROS: no leakage of fluid, pelvic pain, or vaginal bleeding.  normal fetal movement.    The following portions of the patient's history were reviewed and updated as appropriate: allergies, current medications, past family history, past medical history, obstetric history, gynecologic history, past social history, past surgical history and problem list.      Objective:  /64 (BP Location: Left arm, Patient Position: Sitting, Cuff Size: Standard)   Ht 5' 2\" (1.575 m)   Wt 83 kg (183 lb)   LMP 2024 (Exact Date)   BMI 33.47 kg/m²   Pregravid Weight/BMI: 65.8 kg (145 lb) (BMI 26.51)  Current Weight: 83 kg (183 lb)   Total Weight Gain: 17.2 kg (38 lb)   Pre-Yarelis Vitals      Flowsheet Row Most Recent Value   Prenatal Assessment    Fetal Heart Rate 140   Fundal Height (cm) 36 cm   Movement Present   Presentation Vertex   Prenatal Vitals    Blood Pressure 106/64   Weight - Scale 83 kg (183 lb)   Urine Albumin/Glucose    Dilation/Effacement/Station    Cervical Dilation 0   Cervical Effacement 0   Fetal Station -4   Vaginal Drainage    Edema    LLE Edema None   RLE Edema None             General: Well appearing, no distress  Abdomen: Soft, gravid, nontender  Extremities: Non tender.  "

## 2025-05-23 ENCOUNTER — ANCILLARY PROCEDURE (OUTPATIENT)
Dept: PERINATAL CARE | Facility: CLINIC | Age: 25
End: 2025-05-23
Attending: OBSTETRICS & GYNECOLOGY
Payer: COMMERCIAL

## 2025-05-23 ENCOUNTER — ULTRASOUND (OUTPATIENT)
Dept: PERINATAL CARE | Facility: CLINIC | Age: 25
End: 2025-05-23
Payer: COMMERCIAL

## 2025-05-23 VITALS
SYSTOLIC BLOOD PRESSURE: 112 MMHG | BODY MASS INDEX: 33.9 KG/M2 | HEART RATE: 107 BPM | HEIGHT: 62 IN | DIASTOLIC BLOOD PRESSURE: 70 MMHG | WEIGHT: 184.2 LBS

## 2025-05-23 DIAGNOSIS — Z36.89 ENCOUNTER FOR ULTRASOUND TO CHECK FETAL GROWTH: ICD-10-CM

## 2025-05-23 DIAGNOSIS — O36.63X0 MACROSOMIA AFFECTING MANAGEMENT OF MOTHER IN THIRD TRIMESTER, SINGLE GESTATION: ICD-10-CM

## 2025-05-23 DIAGNOSIS — Z36.89 ENCOUNTER FOR ULTRASOUND TO ASSESS FETAL GROWTH: ICD-10-CM

## 2025-05-23 DIAGNOSIS — Z3A.37 37 WEEKS GESTATION OF PREGNANCY: ICD-10-CM

## 2025-05-23 DIAGNOSIS — O36.63X0 MACROSOMIA OF FETUS AFFECTING MANAGEMENT OF MOTHER IN THIRD TRIMESTER, SINGLE OR UNSPECIFIED FETUS: Primary | ICD-10-CM

## 2025-05-23 LAB — GP B STREP DNA SPEC QL NAA+PROBE: NEGATIVE

## 2025-05-23 PROCEDURE — 76816 OB US FOLLOW-UP PER FETUS: CPT

## 2025-05-23 NOTE — PROGRESS NOTES
"[facility]: Sultana was seen today for fetal growth assessment ultrasound. Report with images are contained in the ultrasound report located under Chart Review tab in Epic.       Problem List Items Addressed This Visit          Obstetrics/Gynecology    Macrosomia affecting management of mother in third trimester - Primary    The term \"macrosomia\" implies growth beyond an absolute birth weight, historically 4,000 g or 4,500g, regardless of gestational age. As birth weight increases, the likelihood of labor abnormalities, shoulder dystocia, birth trauma, and permanent injury to the  increases. Maternal factors associated with macrosomia include constitutional factors, preexisting diabetes and GDM, maternal prepregnancy obesity, excessive gestational weight gain, hyperglycemia, dyslipidemia, prior macrosomia, and postterm pregnancy (>=42 weeks). Diagnosis of macrosomia is challenging, as an accurate diagnosis can only be made after birth, and the prenatal prediction of  birth weight is imprecise, and although published formulas for estimating fetal weight show a correlation with birth weight, the variability of the estimate is up to 20% with most of the formulas. Despite the imprecise prediction of macrosomia, scheduled  birth may be beneficial for newborns with suspected macrosomia who have an estimated fetal weight of at least 5000g (11 pounds) in women without diabetes and an estimated fetal weight of at least 4500g (9 pounds 14 ounces) in women with diabetes.  birth reduces, but does not eliminate, the risk of birth trauma and  brachial plexus palsy associated with macrosomia.    Today's estimated weight is not above the threshold at which  delivery is recommended for the purpose of prevention of shoulder dystocia. If an additional growth assessment will assist you with delivery planning, please do not hesitate to contact us to arrange " follow-up, otherwise, she can continue pregnancy with expectant management without further ultrasounds unless an additional indication or concern arises.          Other Visit Diagnoses         37 weeks gestation of pregnancy          Encounter for ultrasound to check fetal growth                  The time spent on this established patient on the encounter date included 5 minutes previsit service time reviewing records and precharting, 5 minutes face-to-face service time counseling regarding results and coordinating care, and  3 minutes charting, totalling 13 minutes.  Please don't hesitate to contact our office with any concerns or questions.  -Cori Greenfield MD

## 2025-05-23 NOTE — LETTER
May 23, 2025     Wellington Ballesteros MD  [address]  [address]  [facility] [address]    Patient: Sultana Dolan   YOB: 2000   Date of Visit: 2025    Please note macrosomia (no GDM).       Dear Dr. Wellington Ballesteros MD:    Thank you for referring Sultana Dolan to me for evaluation. Below are my notes for this consultation.    If you have questions, please do not hesitate to call me. I look forward to following your patient along with you.         Sincerely,        Cori Greenfield MD        CC: No Recipients    Cori Greenfield MD  2025  2:44 PM  Signed  [facility]: Sultana was seen today for fetal growth assessment ultrasound. Report with images are contained in the ultrasound report located under Chart Review tab in Epic.   The time spent on this established patient on the encounter date included 5 minutes previsit service time reviewing records and precharting, 5 minutes face-to-face service time counseling regarding results and coordinating care, and  3 minutes charting, totalling 13 minutes.  Please don't hesitate to contact our office with any concerns or questions.  -Cori Greenfield MD

## 2025-05-23 NOTE — ASSESSMENT & PLAN NOTE
"The term \"macrosomia\" implies growth beyond an absolute birth weight, historically 4,000 g or 4,500g, regardless of gestational age. As birth weight increases, the likelihood of labor abnormalities, shoulder dystocia, birth trauma, and permanent injury to the  increases. Maternal factors associated with macrosomia include constitutional factors, preexisting diabetes and GDM, maternal prepregnancy obesity, excessive gestational weight gain, hyperglycemia, dyslipidemia, prior macrosomia, and postterm pregnancy (>=42 weeks). Diagnosis of macrosomia is challenging, as an accurate diagnosis can only be made after birth, and the prenatal prediction of  birth weight is imprecise, and although published formulas for estimating fetal weight show a correlation with birth weight, the variability of the estimate is up to 20% with most of the formulas. Despite the imprecise prediction of macrosomia, scheduled  birth may be beneficial for newborns with suspected macrosomia who have an estimated fetal weight of at least 5000g (11 pounds) in women without diabetes and an estimated fetal weight of at least 4500g (9 pounds 14 ounces) in women with diabetes.  birth reduces, but does not eliminate, the risk of birth trauma and  brachial plexus palsy associated with macrosomia.    Today's estimated weight is not above the threshold at which  delivery is recommended for the purpose of prevention of shoulder dystocia. If an additional growth assessment will assist you with delivery planning, please do not hesitate to contact us to arrange follow-up, otherwise, she can continue pregnancy with expectant management without further ultrasounds unless an additional indication or concern arises.  "

## 2025-05-24 PROBLEM — Z3A.37 37 WEEKS GESTATION OF PREGNANCY: Status: ACTIVE | Noted: 2024-12-30

## 2025-05-29 ENCOUNTER — ROUTINE PRENATAL (OUTPATIENT)
Dept: OBGYN CLINIC | Facility: CLINIC | Age: 25
End: 2025-05-29
Payer: COMMERCIAL

## 2025-05-29 VITALS — DIASTOLIC BLOOD PRESSURE: 56 MMHG | BODY MASS INDEX: 33.54 KG/M2 | SYSTOLIC BLOOD PRESSURE: 100 MMHG | WEIGHT: 183.4 LBS

## 2025-05-29 DIAGNOSIS — O99.280 THYROID DISEASE AFFECTING PREGNANCY: ICD-10-CM

## 2025-05-29 DIAGNOSIS — O99.013 ANEMIA AFFECTING PREGNANCY IN THIRD TRIMESTER: ICD-10-CM

## 2025-05-29 DIAGNOSIS — O36.63X0 MACROSOMIA OF FETUS AFFECTING MANAGEMENT OF MOTHER IN THIRD TRIMESTER, SINGLE OR UNSPECIFIED FETUS: Primary | ICD-10-CM

## 2025-05-29 DIAGNOSIS — E07.9 THYROID DISEASE AFFECTING PREGNANCY: ICD-10-CM

## 2025-05-29 DIAGNOSIS — Z3A.37 37 WEEKS GESTATION OF PREGNANCY: ICD-10-CM

## 2025-05-29 LAB
SL AMB  POCT GLUCOSE, UA: NEGATIVE
SL AMB POCT URINE PROTEIN: NEGATIVE

## 2025-05-29 PROCEDURE — 81002 URINALYSIS NONAUTO W/O SCOPE: CPT | Performed by: OBSTETRICS & GYNECOLOGY

## 2025-05-29 PROCEDURE — PNV: Performed by: OBSTETRICS & GYNECOLOGY

## 2025-05-29 NOTE — PROGRESS NOTES
Routine Prenatal Visit  [facility] [place]  [address], [address], [place] [address]    Assessment/Plan:  Sultana is a 25 y.o. year old  at 37w6d who presents for routine prenatal visit.     Assessment & Plan  Macrosomia of fetus affecting management of mother in third trimester, single or unspecified fetus  2025 u/s at 36wks EFW 93%ile (3,618g), with AC > 99%.  Extrapolated EFW between 4,000g to 4,500g at [facility] which is c/w macrosomia.  Reviewed ultrasounds not always accurate at predicting weight.  Larger babies have increase risk  for delivery and shoulder dystocia, but it is difficult to predict who these will happen to.  Does not meet threshold to recommend  for delivery to prevent shoulder dystocia.  Discussed pros and cons of IOL at 39 weeks and the ARRIVE trial, which did not show increase risk of  of IOL at 39wk primigravidas with unfavorable cervix, but actually lower  and lower elevated blood pressure in pregnancy in woman induced.  Macrosomia is not an indication for induction.  After review, Sultana is not interested at this time.       Anemia affecting pregnancy in third trimester  Continue PO iron.       Thyroid disease affecting pregnancy  Continue levothyroxine       37 weeks gestation of pregnancy    Orders:    POCT urine dip      Next OB Visit 1 weeks.    Subjective:     CC: Prenatal care    Sultana Dolan is a 25 y.o.  female who presents for routine prenatal care at 37w6d.  Pregnancy ROS: no leakage of fluid, pelvic pain, or vaginal bleeding.  normal fetal movement.    The following portions of the patient's history were reviewed and updated as appropriate: allergies, current medications, past family history, past medical history, obstetric history, gynecologic history, past social history, past surgical history and problem list.      Objective:  /56   Wt 83.2 kg (183 lb 6.4 oz)   LMP 2024 (Exact Date)    BMI 33.54 kg/m²   Pregravid Weight/BMI: 65.8 kg (145 lb) (BMI 26.51)  Current Weight: 83.2 kg (183 lb 6.4 oz)   Total Weight Gain: 17.4 kg (38 lb 6.4 oz)   Pre- Vitals      Flowsheet Row Most Recent Value   Prenatal Assessment    Fetal Heart Rate 140   Fundal Height (cm) 38 cm   Movement Present   Presentation Vertex   Prenatal Vitals    Blood Pressure 100/56   Weight - Scale 83.2 kg (183 lb 6.4 oz)   Urine Albumin/Glucose    Dilation/Effacement/Station    Vaginal Drainage    Edema    LLE Edema None   RLE Edema None             General: Well appearing, no distress  Abdomen: Soft, gravid, nontender  Extremities: Non tender.

## 2025-05-29 NOTE — ASSESSMENT & PLAN NOTE
2025 u/s at 36wks EFW 93%ile (3,618g), with AC > 99%.  Extrapolated EFW between 4,000g to 4,500g at [facility] which is c/w macrosomia.  Reviewed ultrasounds not always accurate at predicting weight.  Larger babies have increase risk  for delivery and shoulder dystocia, but it is difficult to predict who these will happen to.  Does not meet threshold to recommend  for delivery to prevent shoulder dystocia.  Discussed pros and cons of IOL at 39 weeks and the ARRIVE trial, which did not show increase risk of  of IOL at 39wk primigravidas with unfavorable cervix, but actually lower  and lower elevated blood pressure in pregnancy in woman induced.  Macrosomia is not an indication for induction.  After review, Sultana is not interested at this time.

## 2025-06-04 ENCOUNTER — ROUTINE PRENATAL (OUTPATIENT)
Dept: OBGYN CLINIC | Facility: CLINIC | Age: 25
End: 2025-06-04
Payer: COMMERCIAL

## 2025-06-04 VITALS
SYSTOLIC BLOOD PRESSURE: 106 MMHG | HEIGHT: 62 IN | WEIGHT: 186 LBS | BODY MASS INDEX: 34.23 KG/M2 | DIASTOLIC BLOOD PRESSURE: 66 MMHG

## 2025-06-04 DIAGNOSIS — O99.280 THYROID DISEASE AFFECTING PREGNANCY: ICD-10-CM

## 2025-06-04 DIAGNOSIS — Z3A.38 38 WEEKS GESTATION OF PREGNANCY: ICD-10-CM

## 2025-06-04 DIAGNOSIS — O99.013 ANEMIA AFFECTING PREGNANCY IN THIRD TRIMESTER: ICD-10-CM

## 2025-06-04 DIAGNOSIS — O36.63X0 MACROSOMIA OF FETUS AFFECTING MANAGEMENT OF MOTHER IN THIRD TRIMESTER, SINGLE OR UNSPECIFIED FETUS: Primary | ICD-10-CM

## 2025-06-04 DIAGNOSIS — E07.9 THYROID DISEASE AFFECTING PREGNANCY: ICD-10-CM

## 2025-06-04 LAB
SL AMB  POCT GLUCOSE, UA: NORMAL
SL AMB POCT URINE PROTEIN: NORMAL

## 2025-06-04 PROCEDURE — PNV: Performed by: OBSTETRICS & GYNECOLOGY

## 2025-06-04 PROCEDURE — 81002 URINALYSIS NONAUTO W/O SCOPE: CPT | Performed by: OBSTETRICS & GYNECOLOGY

## 2025-06-04 NOTE — ASSESSMENT & PLAN NOTE
Discussed option of elective labor induction. Plans on expectant management for now. Reviewed R/B of induction vs expectant management

## 2025-06-04 NOTE — PROGRESS NOTES
"Routine Prenatal Visit  [facility] [address], [address], [place] [address]    Assessment/Plan:  Sultana is a 25 y.o. year old  at 38w5d who presents for routine prenatal visit.       Assessment & Plan  Macrosomia of fetus affecting management of mother in third trimester, single or unspecified fetus  Discussed option of elective labor induction. Plans on expectant management for now. Reviewed R/B of induction vs expectant management       Thyroid disease affecting pregnancy         Anemia affecting pregnancy in third trimester  38 weeks gestation of pregnancy      Orders:    POCT urine dip      Subjective:     CC: Prenatal care    Sultana Dolan is a 25 y.o.  female who presents for routine prenatal care at 38w5d.  Pregnancy ROS: no leakage of fluid, pelvic pain, or vaginal bleeding.  normal fetal movement.    The following portions of the patient's history were reviewed and updated as appropriate: allergies, current medications, past family history, past medical history, obstetric history, gynecologic history, past social history, past surgical history and problem list.      Objective:  /66 (BP Location: Left arm, Patient Position: Sitting, Cuff Size: Standard)   Ht 5' 2\" (1.575 m)   Wt 84.4 kg (186 lb)   LMP 2024 (Exact Date)   BMI 34.02 kg/m²   Pregravid Weight/BMI: 65.8 kg (145 lb) (BMI 26.51)  Current Weight: 84.4 kg (186 lb)   Total Weight Gain: 18.6 kg (41 lb)   Pre- Vitals      Flowsheet Row Most Recent Value   Prenatal Assessment    Fetal Heart Rate 140   Fundal Height (cm) 39 cm   Movement Present   Presentation Vertex   Prenatal Vitals    Blood Pressure 106/66   Weight - Scale 84.4 kg (186 lb)   Urine Albumin/Glucose    Dilation/Effacement/Station    Vaginal Drainage    Edema              General: Well appearing, no distress  Respiratory: Unlabored breathing  Cardiovascular: Regular rate.  Abdomen: Soft, gravid, nontender  Fundal Height: " Appropriate for gestational age.  Extremities: Warm and well perfused.  Non tender.

## 2025-06-08 ENCOUNTER — HOSPITAL ENCOUNTER (OUTPATIENT)
Facility: HOSPITAL | Age: 25
Discharge: HOME | End: 2025-06-08
Attending: OBSTETRICS & GYNECOLOGY | Admitting: OBSTETRICS & GYNECOLOGY
Payer: COMMERCIAL

## 2025-06-08 ENCOUNTER — NURSE TRIAGE (OUTPATIENT)
Dept: OTHER | Facility: OTHER | Age: 25
End: 2025-06-08

## 2025-06-08 VITALS — SYSTOLIC BLOOD PRESSURE: 122 MMHG | HEART RATE: 65 BPM | DIASTOLIC BLOOD PRESSURE: 72 MMHG

## 2025-06-08 PROBLEM — O47.9 UTERINE CONTRACTIONS DURING PREGNANCY: Status: ACTIVE | Noted: 2025-06-08

## 2025-06-08 PROBLEM — Z3A.38 38 WEEKS GESTATION OF PREGNANCY: Status: RESOLVED | Noted: 2024-12-30 | Resolved: 2025-06-08

## 2025-06-08 PROCEDURE — NC001 PR NO CHARGE: Performed by: OBSTETRICS & GYNECOLOGY

## 2025-06-08 PROCEDURE — 59025 FETAL NON-STRESS TEST: CPT | Performed by: OBSTETRICS & GYNECOLOGY

## 2025-06-08 PROCEDURE — 99213 OFFICE O/P EST LOW 20 MIN: CPT

## 2025-06-08 PROCEDURE — 76815 OB US LIMITED FETUS(S): CPT | Performed by: OBSTETRICS & GYNECOLOGY

## 2025-06-08 NOTE — TELEPHONE ENCOUNTER
Regardin Weeks pregnanat think I'm going into labor  ----- Message from Ally LAN sent at 2025  6:28 PM EDT -----  '' I'm 39 weeks pregnant I think I'm going into labor.''

## 2025-06-08 NOTE — TELEPHONE ENCOUNTER
"REASON FOR CONVERSATION: Pregnancy Problem    SYMPTOMS: Patient called in stating she is 39w2d pregnant. She started having strong contractions today and tonight they have become strong and consistent, she noted they are happening <5 minutes apart, she noted she is leaking vaginal fluid and blood, and she noted decreased fetal movement.     OTHER HEALTH INFORMATION: NA    PROTOCOL DISPOSITION: Go to LD Now    CARE ADVICE PROVIDED: Advised patient to be evaluated at the Labor and Delivery Department now. She noted she has another adult to drive her and verbalized understanding.     Called patient back and advised her to report straight to L&D, ring the after hour doorbell at the women and babies pavilion at the  locations- she verbalized understanding.     PRACTICE FOLLOW-UP: No follow up needed at this time.       Reason for Disposition   [1] First baby (primipara) AND [2] contractions < 6 minutes apart  AND [3] present 2 hours    Answer Assessment - Initial Assessment Questions  1. ONSET: \"When did the symptoms begin?\"           Today    2. CONTRACTIONS: \"Describe the contractions that you are having.\" (e.g., duration, frequency, regularity, severity)        Having all day  Started to get consistent the past 1.5 hour, increased pain and about 5 minutes apart.   3. PREGNANCY: \"How many weeks pregnant are you?\"        39w2d    4. BOYD: \"What date are you expecting to deliver?\"        6/13/2025    5. PARITY: \"Have you had a baby before?\" If Yes, ask: \"How long did the labor last?\"        First baby    6. FETAL MOVEMENT: \"Has the baby's movement decreased or changed significantly from normal?\"        Denies    7. OTHER SYMPTOMS: \"Do you have any other symptoms?\" (e.g., leaking fluid from vagina, vaginal bleeding, fever, hand/facial swelling)        Fluid and vaginal bleeding  Water didn't break, just spotting, sometimes bloody and sometimes not.    Protocols used: Pregnancy - Labor-Adult-AH    "

## 2025-06-09 ENCOUNTER — ANESTHESIA EVENT (INPATIENT)
Dept: ANESTHESIOLOGY | Facility: HOSPITAL | Age: 25
End: 2025-06-09
Payer: COMMERCIAL

## 2025-06-09 ENCOUNTER — HOSPITAL ENCOUNTER (INPATIENT)
Facility: HOSPITAL | Age: 25
LOS: 2 days | Discharge: HOME | End: 2025-06-11
Attending: OBSTETRICS & GYNECOLOGY | Admitting: OBSTETRICS & GYNECOLOGY
Payer: COMMERCIAL

## 2025-06-09 ENCOUNTER — ANESTHESIA (INPATIENT)
Dept: ANESTHESIOLOGY | Facility: HOSPITAL | Age: 25
End: 2025-06-09
Payer: COMMERCIAL

## 2025-06-09 DIAGNOSIS — Z37.9 NORMAL LABOR: ICD-10-CM

## 2025-06-09 PROBLEM — O47.9 UTERINE CONTRACTIONS DURING PREGNANCY: Status: RESOLVED | Noted: 2025-06-08 | Resolved: 2025-06-09

## 2025-06-09 LAB
ABO GROUP BLD: NORMAL
BACTERIA UR QL AUTO: NORMAL /HPF
BASE EXCESS BLDCOA CALC-SCNC: -6.2 MMOL/L (ref 3–11)
BASE EXCESS BLDCOV CALC-SCNC: -5.2 MMOL/L (ref 1–9)
BASOPHILS # BLD MANUAL: 0 THOUSAND/UL (ref 0–0.1)
BASOPHILS NFR MAR MANUAL: 0 % (ref 0–1)
BILIRUB UR QL STRIP: NEGATIVE
BLD GP AB SCN SERPL QL: NEGATIVE
CLARITY UR: CLEAR
COLOR UR: YELLOW
EOSINOPHIL # BLD MANUAL: 0 THOUSAND/UL (ref 0–0.4)
EOSINOPHIL NFR BLD MANUAL: 0 % (ref 0–6)
ERYTHROCYTE [DISTWIDTH] IN BLOOD BY AUTOMATED COUNT: 14.6 % (ref 11.6–15.1)
GLUCOSE UR STRIP-MCNC: NEGATIVE MG/DL
HCO3 BLDCOA-SCNC: 23 MMOL/L (ref 17.3–27.3)
HCO3 BLDCOV-SCNC: 20.7 MMOL/L (ref 12.2–28.6)
HCT VFR BLD AUTO: 36.2 % (ref 34.8–46.1)
HGB BLD-MCNC: 11.6 G/DL (ref 11.5–15.4)
HGB UR QL STRIP.AUTO: ABNORMAL
HOLD SPECIMEN: NORMAL
KETONES UR STRIP-MCNC: NEGATIVE MG/DL
LEUKOCYTE ESTERASE UR QL STRIP: NEGATIVE
LYMPHOCYTES # BLD AUTO: 1.72 THOUSAND/UL (ref 0.6–4.47)
LYMPHOCYTES # BLD AUTO: 10 % (ref 14–44)
MCH RBC QN AUTO: 27.3 PG (ref 26.8–34.3)
MCHC RBC AUTO-ENTMCNC: 32 G/DL (ref 31.4–37.4)
MCV RBC AUTO: 85 FL (ref 82–98)
MONOCYTES # BLD AUTO: 0.34 THOUSAND/UL (ref 0–1.22)
MONOCYTES NFR BLD: 2 % (ref 4–12)
MUCOUS THREADS UR QL AUTO: NORMAL
NEUTROPHILS # BLD MANUAL: 15.09 THOUSAND/UL (ref 1.85–7.62)
NEUTS SEG NFR BLD AUTO: 88 % (ref 43–75)
NITRITE UR QL STRIP: NEGATIVE
NON-SQ EPI CELLS URNS QL MICRO: NORMAL /HPF
O2 CT VFR BLDCOA CALC: 4.7 ML/DL
OXYHGB MFR BLDCOA: 19.6 %
OXYHGB MFR BLDCOV: 49.6 %
PCO2 BLDCOA: 60.4 MM[HG] (ref 30–60)
PCO2 BLDCOV: 41.6 MM HG (ref 27–43)
PH BLDCOA: 7.2 [PH] (ref 7.23–7.43)
PH BLDCOV: 7.32 [PH] (ref 7.19–7.49)
PH UR STRIP.AUTO: 6.5 [PH]
PLATELET # BLD AUTO: 179 THOUSANDS/UL (ref 149–390)
PLATELET BLD QL SMEAR: ADEQUATE
PMV BLD AUTO: 13.3 FL (ref 8.9–12.7)
PO2 BLDCOA: 15 MM HG (ref 5–25)
PO2 BLDCOV: 24.2 MM HG (ref 15–45)
PROT UR STRIP-MCNC: NEGATIVE MG/DL
RBC # BLD AUTO: 4.25 MILLION/UL (ref 3.81–5.12)
RBC #/AREA URNS AUTO: NORMAL /HPF
RBC MORPH BLD: NORMAL
RH BLD: POSITIVE
SAO2 % BLDCOV: 12 ML/DL
SP GR UR STRIP.AUTO: 1.02 (ref 1–1.03)
SPECIMEN EXPIRATION DATE: NORMAL
TREPONEMA PALLIDUM IGG+IGM AB [PRESENCE] IN SERUM OR PLASMA BY IMMUNOASSAY: NORMAL
UROBILINOGEN UR STRIP-ACNC: <2 MG/DL
WBC # BLD AUTO: 17.15 THOUSAND/UL (ref 4.31–10.16)
WBC #/AREA URNS AUTO: NORMAL /HPF

## 2025-06-09 PROCEDURE — 86901 BLOOD TYPING SEROLOGIC RH(D): CPT | Performed by: OBSTETRICS & GYNECOLOGY

## 2025-06-09 PROCEDURE — 86900 BLOOD TYPING SEROLOGIC ABO: CPT | Performed by: OBSTETRICS & GYNECOLOGY

## 2025-06-09 PROCEDURE — 4A1HXCZ MONITORING OF PRODUCTS OF CONCEPTION, CARDIAC RATE, EXTERNAL APPROACH: ICD-10-PCS | Performed by: OBSTETRICS & GYNECOLOGY

## 2025-06-09 PROCEDURE — 86850 RBC ANTIBODY SCREEN: CPT | Performed by: OBSTETRICS & GYNECOLOGY

## 2025-06-09 PROCEDURE — 0KQM0ZZ REPAIR PERINEUM MUSCLE, OPEN APPROACH: ICD-10-PCS | Performed by: OBSTETRICS & GYNECOLOGY

## 2025-06-09 PROCEDURE — 81001 URINALYSIS AUTO W/SCOPE: CPT | Performed by: OBSTETRICS & GYNECOLOGY

## 2025-06-09 PROCEDURE — 85007 BL SMEAR W/DIFF WBC COUNT: CPT | Performed by: OBSTETRICS & GYNECOLOGY

## 2025-06-09 PROCEDURE — NC001 PR NO CHARGE: Performed by: OBSTETRICS & GYNECOLOGY

## 2025-06-09 PROCEDURE — 99212 OFFICE O/P EST SF 10 MIN: CPT

## 2025-06-09 PROCEDURE — 85027 COMPLETE CBC AUTOMATED: CPT | Performed by: OBSTETRICS & GYNECOLOGY

## 2025-06-09 PROCEDURE — 82805 BLOOD GASES W/O2 SATURATION: CPT | Performed by: OBSTETRICS & GYNECOLOGY

## 2025-06-09 PROCEDURE — 86780 TREPONEMA PALLIDUM: CPT | Performed by: OBSTETRICS & GYNECOLOGY

## 2025-06-09 PROCEDURE — 87086 URINE CULTURE/COLONY COUNT: CPT | Performed by: OBSTETRICS & GYNECOLOGY

## 2025-06-09 RX ORDER — SODIUM CHLORIDE, SODIUM LACTATE, POTASSIUM CHLORIDE, CALCIUM CHLORIDE 600; 310; 30; 20 MG/100ML; MG/100ML; MG/100ML; MG/100ML
125 INJECTION, SOLUTION INTRAVENOUS CONTINUOUS
Status: DISCONTINUED | OUTPATIENT
Start: 2025-06-09 | End: 2025-06-11 | Stop reason: HOSPADM

## 2025-06-09 RX ORDER — LIDOCAINE HYDROCHLORIDE AND EPINEPHRINE 15; 5 MG/ML; UG/ML
INJECTION, SOLUTION EPIDURAL AS NEEDED
Status: DISCONTINUED | OUTPATIENT
Start: 2025-06-09 | End: 2025-06-10 | Stop reason: HOSPADM

## 2025-06-09 RX ORDER — ONDANSETRON 2 MG/ML
4 INJECTION INTRAMUSCULAR; INTRAVENOUS EVERY 6 HOURS PRN
Status: DISCONTINUED | OUTPATIENT
Start: 2025-06-09 | End: 2025-06-09

## 2025-06-09 RX ORDER — IBUPROFEN 600 MG/1
600 TABLET, FILM COATED ORAL EVERY 6 HOURS
Status: DISCONTINUED | OUTPATIENT
Start: 2025-06-09 | End: 2025-06-11 | Stop reason: HOSPADM

## 2025-06-09 RX ORDER — OXYTOCIN/0.9 % SODIUM CHLORIDE 30/500 ML
62.5 PLASTIC BAG, INJECTION (ML) INTRAVENOUS CONTINUOUS
Status: DISPENSED | OUTPATIENT
Start: 2025-06-09 | End: 2025-06-10

## 2025-06-09 RX ORDER — OXYTOCIN/0.9 % SODIUM CHLORIDE 30/500 ML
PLASTIC BAG, INJECTION (ML) INTRAVENOUS
Status: DISPENSED
Start: 2025-06-09 | End: 2025-06-10

## 2025-06-09 RX ORDER — ACETAMINOPHEN 325 MG/1
650 TABLET ORAL EVERY 4 HOURS PRN
Status: DISCONTINUED | OUTPATIENT
Start: 2025-06-09 | End: 2025-06-11 | Stop reason: HOSPADM

## 2025-06-09 RX ORDER — ONDANSETRON 2 MG/ML
4 INJECTION INTRAMUSCULAR; INTRAVENOUS EVERY 8 HOURS PRN
Status: DISCONTINUED | OUTPATIENT
Start: 2025-06-09 | End: 2025-06-11 | Stop reason: HOSPADM

## 2025-06-09 RX ORDER — SIMETHICONE 80 MG
80 TABLET,CHEWABLE ORAL 4 TIMES DAILY PRN
Status: DISCONTINUED | OUTPATIENT
Start: 2025-06-09 | End: 2025-06-11 | Stop reason: HOSPADM

## 2025-06-09 RX ORDER — BUPIVACAINE HYDROCHLORIDE 2.5 MG/ML
30 INJECTION, SOLUTION EPIDURAL; INFILTRATION; INTRACAUDAL; PERINEURAL ONCE AS NEEDED
Status: DISCONTINUED | OUTPATIENT
Start: 2025-06-09 | End: 2025-06-09

## 2025-06-09 RX ORDER — DOCUSATE SODIUM 100 MG/1
100 CAPSULE, LIQUID FILLED ORAL 2 TIMES DAILY
Status: DISCONTINUED | OUTPATIENT
Start: 2025-06-09 | End: 2025-06-11 | Stop reason: HOSPADM

## 2025-06-09 RX ORDER — BENZOCAINE/MENTHOL 6 MG-10 MG
1 LOZENGE MUCOUS MEMBRANE DAILY PRN
Status: DISCONTINUED | OUTPATIENT
Start: 2025-06-09 | End: 2025-06-11 | Stop reason: HOSPADM

## 2025-06-09 RX ORDER — LEVOTHYROXINE SODIUM 75 UG/1
150 TABLET ORAL
Status: DISCONTINUED | OUTPATIENT
Start: 2025-06-09 | End: 2025-06-09

## 2025-06-09 RX ORDER — PHENYLEPHRINE HCL IN 0.9% NACL 1 MG/10 ML
SYRINGE (ML) INTRAVENOUS AS NEEDED
Status: DISCONTINUED | OUTPATIENT
Start: 2025-06-09 | End: 2025-06-10 | Stop reason: HOSPADM

## 2025-06-09 RX ORDER — DIPHENHYDRAMINE HYDROCHLORIDE 50 MG/ML
25 INJECTION, SOLUTION INTRAMUSCULAR; INTRAVENOUS EVERY 6 HOURS PRN
Status: DISCONTINUED | OUTPATIENT
Start: 2025-06-09 | End: 2025-06-11 | Stop reason: HOSPADM

## 2025-06-09 RX ORDER — BUTORPHANOL TARTRATE 1 MG/ML
1 INJECTION, SOLUTION INTRAMUSCULAR; INTRAVENOUS
Status: DISCONTINUED | OUTPATIENT
Start: 2025-06-09 | End: 2025-06-09

## 2025-06-09 RX ADMIN — Medication 62.5 MILLI-UNITS/MIN: at 21:35

## 2025-06-09 RX ADMIN — SODIUM CHLORIDE, SODIUM LACTATE, POTASSIUM CHLORIDE, AND CALCIUM CHLORIDE 999 ML/HR: .6; .31; .03; .02 INJECTION, SOLUTION INTRAVENOUS at 08:07

## 2025-06-09 RX ADMIN — LEVOTHYROXINE SODIUM 150 MCG: 0.07 TABLET ORAL at 06:56

## 2025-06-09 RX ADMIN — SODIUM CHLORIDE, SODIUM LACTATE, POTASSIUM CHLORIDE, AND CALCIUM CHLORIDE 125 ML/HR: .6; .31; .03; .02 INJECTION, SOLUTION INTRAVENOUS at 08:58

## 2025-06-09 RX ADMIN — IBUPROFEN 600 MG: 600 TABLET ORAL at 21:06

## 2025-06-09 RX ADMIN — ROPIVACAINE HYDROCHLORIDE: 2 INJECTION, SOLUTION EPIDURAL; INFILTRATION at 16:06

## 2025-06-09 RX ADMIN — DOCUSATE SODIUM 100 MG: 100 CAPSULE, LIQUID FILLED ORAL at 21:18

## 2025-06-09 RX ADMIN — BENZOCAINE AND LEVOMENTHOL 1 APPLICATION: 200; 5 SPRAY TOPICAL at 21:18

## 2025-06-09 RX ADMIN — ACETAMINOPHEN 650 MG: 325 TABLET, FILM COATED ORAL at 22:23

## 2025-06-09 RX ADMIN — Medication 150 MCG: at 09:37

## 2025-06-09 RX ADMIN — LIDOCAINE HYDROCHLORIDE,EPINEPHRINE BITARTRATE 2 ML: 15; .005 INJECTION, SOLUTION EPIDURAL; INFILTRATION; INTRACAUDAL; PERINEURAL at 09:28

## 2025-06-09 RX ADMIN — LIDOCAINE HYDROCHLORIDE,EPINEPHRINE BITARTRATE 3 ML: 15; .005 INJECTION, SOLUTION EPIDURAL; INFILTRATION; INTRACAUDAL; PERINEURAL at 09:25

## 2025-06-09 RX ADMIN — ROPIVACAINE HYDROCHLORIDE: 2 INJECTION, SOLUTION EPIDURAL; INFILTRATION at 09:45

## 2025-06-09 RX ADMIN — HYDROCORTISONE 1 APPLICATION: 1 CREAM TOPICAL at 21:18

## 2025-06-09 RX ADMIN — WITCH HAZEL 1 PAD: 500 SOLUTION RECTAL; TOPICAL at 21:18

## 2025-06-09 NOTE — PLAN OF CARE
Problem: PAIN - ADULT  Goal: Verbalizes/displays adequate comfort level or baseline comfort level  Description: Interventions:  - Encourage patient to monitor pain and request assistance  - Assess pain using appropriate pain scale  - Administer analgesics as ordered based on type and severity of pain and evaluate response  - Implement non-pharmacological measures as appropriate and evaluate response  - Consider cultural and social influences on pain and pain management  - Notify physician/advanced practitioner if interventions unsuccessful or patient reports new pain  - Educate patient/family on pain management process including their role and importance of  reporting pain   - Provide non-pharmacologic/complimentary pain relief interventions  Outcome: Progressing     Problem: INFECTION - ADULT  Goal: Absence or prevention of progression during hospitalization  Description: INTERVENTIONS:  - Assess and monitor for signs and symptoms of infection  - Monitor lab/diagnostic results  - Monitor all insertion sites, i.e. indwelling lines, tubes, and drains  - Monitor endotracheal if appropriate and nasal secretions for changes in amount and color  - Danube appropriate cooling/warming therapies per order  - Administer medications as ordered  - Instruct and encourage patient and family to use good hand hygiene technique  - Identify and instruct in appropriate isolation precautions for identified infection/condition  Outcome: Progressing  Goal: Absence of fever/infection during neutropenic period  Description: INTERVENTIONS:  - Monitor WBC  - Perform strict hand hygiene  - Limit to healthy visitors only  - No plants, dried, fresh or silk flowers with moreno in patient room  Outcome: Progressing     Problem: SAFETY ADULT  Goal: Patient will remain free of falls  Description: INTERVENTIONS:  - Educate patient/family on patient safety including physical limitations  - Instruct patient to call for assistance with activity   -  Consider consulting OT/PT to assist with strengthening/mobility based on AM PAC & JH-HLM score  - Consult OT/PT to assist with strengthening/mobility   - Keep Call bell within reach  - Keep bed low and locked with side rails adjusted as appropriate  - Keep care items and personal belongings within reach  - Initiate and maintain comfort rounds  Outcome: Progressing  Goal: Maintain or return to baseline ADL function  Description: INTERVENTIONS:  -  Assess patient's ability to carry out ADLs; assess patient's baseline for ADL function and identify physical deficits which impact ability to perform ADLs (bathing, care of mouth/teeth, toileting, grooming, dressing, etc.)  - Assess/evaluate cause of self-care deficits   - Assess range of motion  - Assess patient's mobility; develop plan if impaired  - Assess patient's need for assistive devices and provide as appropriate  - Encourage maximum independence but intervene and supervise when necessary  - Involve family in performance of ADLs  - Assess for home care needs following discharge   - Consider OT consult to assist with ADL evaluation and planning for discharge  - Provide patient education as appropriate  - Monitor functional capacity and physical performance, use of AM PAC & JH-HLM   - Monitor gait, balance and fatigue with ambulation    Outcome: Progressing  Goal: Maintains/Returns to pre admission functional level  Description: INTERVENTIONS:  - Perform AM-PAC 6 Click Basic Mobility/ Daily Activity assessment daily.  - Set and communicate daily mobility goal to care team and patient/family/caregiver.   - Collaborate with rehabilitation services on mobility goals if consulted  - Out of bed for toileting  - Record patient progress and toleration of activity level   Outcome: Progressing     Problem: DISCHARGE PLANNING  Goal: Discharge to home or other facility with appropriate resources  Description: INTERVENTIONS:  - Identify barriers to discharge w/patient and  caregiver  - Arrange for needed discharge resources and transportation as appropriate  - Identify discharge learning needs (meds, wound care, etc.)  - Arrange for interpretive services to assist at discharge as needed  - Refer to Case Management Department for coordinating discharge planning if the patient needs post-hospital services based on physician/advanced practitioner order or complex needs related to functional status, cognitive ability, or social support system  Outcome: Progressing     Problem: Knowledge Deficit  Goal: Patient/family/caregiver demonstrates understanding of disease process, treatment plan, medications, and discharge instructions  Description: Complete learning assessment and assess knowledge base.  Interventions:  - Provide teaching at level of understanding  - Provide teaching via preferred learning methods  Outcome: Progressing  Goal: Verbalizes understanding of labor plan  Description: Assess patient/family/caregiver's baseline knowledge level and ability to understand information.  Provide education via patient/family/caregiver's preferred learning method at appropriate level of understanding.     1. Provide teaching at level of understanding.  2. Provide teaching via preferred learning method(s).  Outcome: Progressing     Problem: Labor & Delivery  Goal: Manages discomfort  Description: Assess and monitor for signs and symptoms of discomfort.  Assess patient's pain level regularly and per hospital policy.  Administer medications as ordered. Support use of nonpharmacological methods to help control pain such as distraction, imagery, relaxation, and application of heat and cold.  Collaborate with interdisciplinary team and patient to determine appropriate pain management plan.    1. Include patient in decisions related to comfort.  2. Offer non-pharmacological pain management interventions.  3. Report ineffective pain management to physician.  Outcome: Progressing  Goal: Patient vital  signs are stable  Description: 1. Assess vital signs - vaginal delivery.  Outcome: Progressing

## 2025-06-09 NOTE — PROCEDURES
Bedside US    4 Quadrant FLAVIA  FLAVIA Q1 (cm): 1.8 cm  FLAVIA Q2 (cm): 4.7 cm  FLAVIA Q3 (cm): 1.7 cm  FLAVIA Q4 (cm): 1 cm  FLAVIA TOTAL (cm): 9.2 cm              Interpretation  Nonstress Test Interpretation: Reactive  Overall Impression: Reassuring  Comments: active fetus in cephalic position

## 2025-06-09 NOTE — ANESTHESIA PREPROCEDURE EVALUATION
Procedure:  LABOR ANALGESIA    Relevant Problems   ENDO   (+) Acquired hypothyroidism   (+) Hypothyroidism      HEMATOLOGY   (+) Anemia affecting pregnancy in third trimester      NEURO/PSYCH   (+) Depression        Physical Exam    Airway     Mallampati score: II  TM Distance: >3 FB  Neck ROM: full  Mouth opening: >= 4 cm      Cardiovascular      Dental   Comment: None loose, No notable dental hx     Pulmonary      Neurological    She appears awake, alert and oriented x3.      Other Findings  post-pubertal.         Latest Reference Range & Units 06/09/25 04:08   WBC 4.31 - 10.16 Thousand/uL 17.15 (H)   RBC 3.81 - 5.12 Million/uL 4.25   Hemoglobin 11.5 - 15.4 g/dL 11.6   Hematocrit 34.8 - 46.1 % 36.2   MCV 82 - 98 fL 85   MCH 26.8 - 34.3 pg 27.3   MCHC 31.4 - 37.4 g/dL 32.0   RDW 11.6 - 15.1 % 14.6   Platelet Count 149 - 390 Thousands/uL 179   MPV 8.9 - 12.7 fL 13.3 (H)   Platelet Estimate Adequate  Adequate   (H): Data is abnormally high      Anesthesia Plan  ASA Score- 2     Anesthesia Type- epidural with ASA Monitors.         Additional Monitors:     Airway Plan: natural airway.    Comment: Active type and screen.       Plan Factors-Exercise tolerance (METS): >4 METS.    Chart reviewed.   Existing labs reviewed. Patient summary reviewed.                  Induction-     Postoperative Plan- .   Monitoring Plan - Monitoring plan - standard ASA monitoring      Perioperative Resuscitation Plan - Level 1 - Full Code.       Informed Consent- Anesthetic plan and risks discussed with patient.  I personally reviewed this patient with the CRNA. Discussed and agreed on the Anesthesia Plan with the CRNA..      NPO Status:  No vitals data found for the desired time range.

## 2025-06-09 NOTE — H&P
Triage Note - OB  Sultana Dolan 25 y.o. female MRN: [id number]  Unit/Bed#: LD TRIAGE 2- Encounter: [id number]        ASSESSMENT/PLAN  Sultana Dolan is a 25 y.o.  at 39w2d  in latent labor.    Assessment & Plan  Uterine contractions during pregnancy  - SVE /-3  -  bpm + moderate variability + accels no decels  - TOCO q3-5  - Bedside US shows active fetus in cephalic position with FLAVIA 9.2  - offered cervical  recheck vs home now, patient desires to labor more at home, advised tylenol PM to help woith discomfort; calling guidelines reviewed  Thyroid disease affecting pregnancy  - managed by PCP  - on levothyroxine 150 mcg daily  Anemia affecting pregnancy in third trimester    Macrosomia affecting management of mother in third trimester  - : 3618g (93%), AC> 99%  Enlarged pituitary gland (HCC)    Abnormal glucose affecting pregnancy  Celiac disease    Bipolar 1 disorder (HCC)  - stable, no meds  - counselor in [place]         Discharge instructions  - Patient instructed to call if experiencing worsening contractions, vaginal bleeding, loss of fluid or decreased fetal movement.  - Will follow up with OBGYN in office      She is a patient of [facility]  D/w Dr. Ballesteros, on call OBGYN Attending Physician  ______________    SUBJECTIVE    BOYD: Estimated Date of Delivery: 25    HPI:  25 y.o.  39w2d presents with contractions every 5 minutes for past several hours. Reports mucous discharge and blood tinged mucous. Denies leaking of fluid. Reports good fetal movement.      Prenatal labs:  Lab Results   Component Value Date    ABO O 2024    RH Positive 2024    ABS Normal 2024    HGB 10.4 (L) 2025     2025    RPR Non Reactive 2025    HEPBSAG Negative 2024    HEPCAB Non Reactive 2024    HIVAGAB Non-Reactive 2024    GC neg 2024    [id number] 173 (H) 2025       Her obstetrical history is significant  "for      Past Medical History[1]    Past Surgical History[2]        ROS:  Constitutional: Negative  Respiratory: Negative  Cardiovascular: Negative    Gastrointestinal: Negative     Physical Exam  General: Well appearing, no distress  Respiratory: normal respiratory rate  Abdomen: Soft, gravid, nontender    Extremities: Warm and well perfused.  Non tender.      OBJECTIVE:  /72   Pulse 65   LMP 09/06/2024 (Exact Date)   There is no height or weight on file to calculate BMI.  Labs: No results found for this or any previous visit (from the past 24 hours).      SVE:   Cervical Dilation: 1  Cervical Effacement: 80  Fetal Station: -3  Method: Manual  OB Examiner: Dr. Moore      FHT:  130 bpm +_ moderate variability + accels no decels    TOCO:     Contraction Duration (seconds): 50  Contraction Intensity: Mild    IMAGING: bedside HURST shows active fetus in cephalic presentation with FLAVIA 9.2 cm      Chey Moore MD  OB/GYN   6/8/2025  8:47 PM      Portions of the record may have been created with voice recognition software.  Occasional wrong word or \"sound a like\" substitutions may have occurred due to the inherent limitations of voice recognition software.  Read the chart carefully and recognize, using context, where substitutions have occurred         [1]   Past Medical History:  Diagnosis Date    Depression 2016    therapist q 2 wk    Dermagraphy     Disease of thyroid gland     Hypothyroidism 2015    Levothyroxine 150 mcg - increased dosage 11/25/2024    Migraine     past history    S/P ACL reconstruction 12/17/2019    Formatting of this note might be different from the original.  Sees [facility]- last seen 05/21/2020--progressing, getting PT---return in 2 months.   7/20 complete formal PT. Working out at gym 3 days/week      Varicella     pt had vaccine in childhood   [2]   Past Surgical History:  Procedure Laterality Date    ANTERIOR CRUCIATE LIGAMENT REPAIR  2015    and 2019    WISDOM TOOTH EXTRACTION  2018     "

## 2025-06-09 NOTE — OB LABOR/OXYTOCIN SAFETY PROGRESS
Labor Progress Note - Sultana Dolan 25 y.o. female MRN: [id number]    Unit/Bed#: -01 Encounter: [id number]       Contraction Frequency (minutes): 1.5-4.5  Contraction Intensity: Moderate/Strong  Uterine Activity Characteristics: Irregular  Cervical Dilation: 10        Cervical Effacement: 100  Fetal Station: 0  Baseline Rate (FHR): 135 bpm  Fetal Heart Rate (FHT): 140 BPM  FHR Category: 1               Vital Signs:   Vitals:    06/09/25 1650   BP: 102/59   Pulse: 73   Resp:    Temp:        Notes/comments:       Completely dilated  Will allow to labor down before begin pushing  Maternal and fetal status reassuring    Eileen Ramos DO 6/9/2025 5:06 PM

## 2025-06-09 NOTE — ASSESSMENT & PLAN NOTE
- SVE 1/80/-3  -  bpm + moderate variability + accels no decels  - TOCO q3-5  - Bedside US shows active fetus in cephalic position with FLAVIA 9.2  - offered cervical  recheck vs home now, patient desires to labor more at home, advised tylenol PM to help woith discomfort; calling guidelines reviewed

## 2025-06-09 NOTE — OB LABOR/OXYTOCIN SAFETY PROGRESS
Labor Progress Note - Sultana Dolan 25 y.o. female MRN: [id number]    Unit/Bed#: -01 Encounter: [id number]       Contraction Frequency (minutes): 1.5-4.5  Contraction Intensity: Moderate/Strong  Uterine Activity Characteristics: Irregular  Cervical Dilation: 10        Cervical Effacement: 100  Fetal Station: 0  Baseline Rate (FHR): 130 bpm  Fetal Heart Rate (FHT): 140 BPM  FHR Category: 2               Vital Signs:   Vitals:    06/09/25 1721   BP:    Pulse: 75   Resp:    Temp:        Notes/comments:       Pt with decels with ctx  Complete, has pushed a few times, wants to push on her side  Watching closely    Eileen Ramos DO 6/9/2025 5:55 PM

## 2025-06-09 NOTE — H&P
History and Physical - OB  Sultana Dolan 25 y.o. female MRN: [id number]  Unit/Bed#: -01 Encounter: [id number]        ASSESSMENT/PLAN  Sultana Dolan is a 25 y.o.  at 39w3d presenting in labor.    Assessment & Plan  Normal labor  - Admit to L&D, IVF and labs  - EFM Category 1, TOCO q4  - Bedside ultrasound confirms vertex  - SVE: /-3  - GBS negative  - EFW:  : 3618g (93%), AC> 99%   - discussed possible use of pitocin if contractions space, though she is making progress on her own    Macrosomia affecting management of mother in third trimester  - : 3618g (93%), AC> 99%   - 1hr , passed 3 hr GTT 68/130/101/101  Abnormal glucose affecting pregnancy  Thyroid disease affecting pregnancy  - managed by PCP  - on levothyroxine 150 mcg daily  Anemia affecting pregnancy in third trimester  - will check admission CBC  Bipolar 1 disorder (HCC)  - stable, no meds  - counselor in [place]  Enlarged pituitary gland (HCC)      She is a patient of [facility]  D/w Dr. Ballesteros, on call OBGYN Attending Physician  ______________    SUBJECTIVE    BOYD: Estimated Date of Delivery: 25    HPI:  25 y.o.  39w3d presents with continuing contractions from before. Denies leaking of fluid. Reports good fetal movement..     Prenatal labs:  Lab Results   Component Value Date    ABO O 2024    RH Positive 2024    ABS Normal 2024    HGB 10.4 (L) 2025     2025    RPR Non Reactive 2025    HEPBSAG Negative 2024    HEPCAB Non Reactive 2024    HIVAGAB Non-Reactive 2024    GC neg 2024    [id number] 173 (H) 2025       Her obstetrical history is significant for      Past Medical History[1]    Past Surgical History[2]        ROS:  Constitutional: Negative  Respiratory: Negative  Cardiovascular: Negative    Gastrointestinal: Negative     Physical Exam  General: Well appearing, no distress  Respiratory: CTAB  "  Cardiovascular: Regular rate  Abdomen: Soft, gravid, nontender    Extremities: Warm and well perfused.  Non tender.      OBJECTIVE:  LMP 09/06/2024 (Exact Date)   There is no height or weight on file to calculate BMI.  Labs: No results found for this or any previous visit (from the past 24 hours).      SVE:   Cervical Dilation: 2  Cervical Effacement: 80  Fetal Station: -3           Chey Moore MD  OB/GYN   6/9/2025  3:44 AM      Portions of the record may have been created with voice recognition software.  Occasional wrong word or \"sound a like\" substitutions may have occurred due to the inherent limitations of voice recognition software.  Read the chart carefully and recognize, using context, where substitutions have occurred       [1]   Past Medical History:  Diagnosis Date    Depression 2016    therapist q 2 wk    Dermagraphy     Disease of thyroid gland     Hypothyroidism 2015    Levothyroxine 150 mcg - increased dosage 11/25/2024    Migraine     past history    S/P ACL reconstruction 12/17/2019    Formatting of this note might be different from the original.  Sees [facility]- last seen 05/21/2020--progressing, getting PT---return in 2 months.   7/20 complete formal PT. Working out at gym 3 days/week      Varicella     pt had vaccine in childhood   [2]   Past Surgical History:  Procedure Laterality Date    ANTERIOR CRUCIATE LIGAMENT REPAIR  2015    and 2019    WISDOM TOOTH EXTRACTION  2018     "

## 2025-06-09 NOTE — OB LABOR/OXYTOCIN SAFETY PROGRESS
Labor Progress Note - Sultana Dolan 25 y.o. female MRN: [id number]    Unit/Bed#: -01 Encounter: [id number]       Contraction Frequency (minutes): 2-5  Contraction Intensity: Mild/Moderate  Uterine Activity Characteristics: Irregular  Cervical Dilation: 2        Cervical Effacement: 80  Fetal Station: -3  Baseline Rate (FHR): 140 bpm  Fetal Heart Rate (FHT): 140 BPM  FHR Category: 2 (overall category 1)               Vital Signs:    Vitals:    25 0423   BP: 100/57   Pulse: 67   Resp: 18   Temp: 98.3 °F (36.8 °C)       Notes/comments:    at 39+3 admitted in labor  - GBS negative  -  EFW 93%, adequate pelvis  - continue expectant management  - recheck cervix shortly      Chey Moore MD 2025 7:37 AM

## 2025-06-09 NOTE — PROCEDURES
Sultana Dolan, a  at 39w2d with an BOYD of 2025, by Last Menstrual Period, was seen at [facility]ERY for the following procedure(s): $Procedure Type: NST, FLAVIA]    Nonstress Test  Reason for NST: Routine  Variability: Moderate  Decelerations: None  Accelerations: Yes  Baseline: 130 BPM  Uterine Irritability: No  Contractions: Irregular    4 Quadrant FLAVIA  FLAVIA Q1 (cm): 1.8 cm  FLAVIA Q2 (cm): 4.7 cm  FLAVIA Q3 (cm): 1.7 cm  FLAVIA Q4 (cm): 1 cm  FLAVIA TOTAL (cm): 9.2 cm              Interpretation  Nonstress Test Interpretation: Reactive  Overall Impression: Reassuring  Comments: active fetus in cephalic position

## 2025-06-09 NOTE — ASSESSMENT & PLAN NOTE
- Admit to L&D, IVF and labs  - EFM Category 1, TOCO q4  - Bedside ultrasound confirms vertex  - SVE: 2/80/-3  - GBS negative  - EFW:  5/23: 3618g (93%), AC> 99%   - discussed possible use of pitocin if contractions space, though she is making progress on her own

## 2025-06-09 NOTE — OB LABOR/OXYTOCIN SAFETY PROGRESS
Labor Progress Note - Sultana Dolan 25 y.o. female MRN: [id number]    Unit/Bed#: -01 Encounter: [id number]       Contraction Frequency (minutes): 2-5  Contraction Intensity: Mild/Moderate  Uterine Activity Characteristics: Irregular  Cervical Dilation: 8        Cervical Effacement: 90  Fetal Station: -2  Baseline Rate (FHR): 135 bpm  Fetal Heart Rate (FHT): 140 BPM  FHR Category: 1               Vital Signs:   Vitals:    06/09/25 1454   BP: 96/51   Pulse: 73   Resp:    Temp: 98.4 °F (36.9 °C)       Notes/comments:       Making progress  Maternal and fetal status reassuring    Eileen Ramos DO 6/9/2025 3:01 PM

## 2025-06-09 NOTE — OB LABOR/OXYTOCIN SAFETY PROGRESS
Labor Progress Note - Sultana Dolan 25 y.o. female MRN: [id number]    Unit/Bed#: -01 Encounter: [id number]       Contraction Frequency (minutes): 4-7  Contraction Intensity: Mild/Moderate  Uterine Activity Characteristics: Irregular  Cervical Dilation: 6-7        Cervical Effacement: 90  Fetal Station: -2  Baseline Rate (FHR): 130 bpm  Fetal Heart Rate (FHT): 140 BPM  FHR Category: 1               Vital Signs: stable  Vitals:    25 1210   BP: 92/52   Pulse: 72   Resp:    Temp:        Notes/comments:   SIUP @ 39.3wks.   Suspected macrosomia:  EFW of 3618g on 25.  Thyroid dysfunction:  continue on 150mcg   Anemia in preg:  Hb-11.6 on admission  Enlarged pituitary gland, follows with Endo  Bipolar I:  no meds, stable.   GBS neg  Rh pos   hopeful, NICU to be present at delivery for meconium.         KACIE Rodriguez MD, FACOG   2025 12:28 PM

## 2025-06-09 NOTE — ANESTHESIA PROCEDURE NOTES
Epidural Block    Patient location during procedure: OB/L&D  Start time: 6/9/2025 9:24 AM  Reason for block: procedure for pain  Staffing  Performed by: Indy Robbins MD  Authorized by: Indy Robbins MD    Preanesthetic Checklist  Completed: patient identified, IV checked, risks and benefits discussed, surgical consent, monitors and equipment checked, pre-op evaluation and timeout performed  Epidural  Patient position: sitting  Prep: ChloraPrep  Sedation Level: no sedation  Patient monitoring: frequent blood pressure checks, continuous pulse oximetry and heart rate  Approach: midline  Location: lumbar, L3-4  Injection technique: MARYANNE saline  Needle  Needle type: Tuohy   Needle gauge: 17 G  Needle insertion depth: 6 cm  Catheter type: multi-orifice  Catheter size: 20 G  Catheter at skin depth: 11 cm  Catheter securement method: stabilization device, clear occlusive dressing and tape  Test dose: negative  Assessment  Sensory level: T10  Number of attempts: 1negative aspiration for CSF, negative aspiration for heme and no paresthesia on injection  patient tolerated the procedure well with no immediate complications  Additional Notes  Straight-forward with 1 redirect. MARYANNE at 6cm

## 2025-06-09 NOTE — PLAN OF CARE
Problem: PAIN - ADULT  Goal: Verbalizes/displays adequate comfort level or baseline comfort level  Description: Interventions:  - Encourage patient to monitor pain and request assistance  - Assess pain using appropriate pain scale  - Administer analgesics as ordered based on type and severity of pain and evaluate response  - Implement non-pharmacological measures as appropriate and evaluate response  - Consider cultural and social influences on pain and pain management  - Notify physician/advanced practitioner if interventions unsuccessful or patient reports new pain  - Educate patient/family on pain management process including their role and importance of  reporting pain   - Provide non-pharmacologic/complimentary pain relief interventions  Outcome: Progressing     Problem: INFECTION - ADULT  Goal: Absence or prevention of progression during hospitalization  Description: INTERVENTIONS:  - Assess and monitor for signs and symptoms of infection  - Monitor lab/diagnostic results  - Monitor all insertion sites, i.e. indwelling lines, tubes, and drains  - Monitor endotracheal if appropriate and nasal secretions for changes in amount and color  - [place] appropriate cooling/warming therapies per order  - Administer medications as ordered  - Instruct and encourage patient and family to use good hand hygiene technique  - Identify and instruct in appropriate isolation precautions for identified infection/condition  Outcome: Progressing  Goal: Absence of fever/infection during neutropenic period  Description: INTERVENTIONS:  - Monitor WBC  - Perform strict hand hygiene  - Limit to healthy visitors only  - No plants, dried, fresh or silk flowers with moreno in patient room  Outcome: Progressing     Problem: SAFETY ADULT  Goal: Patient will remain free of falls  Description: INTERVENTIONS:  - Educate patient/family on patient safety including physical limitations  - Instruct patient to call for assistance with activity   -  Consider consulting OT/PT to assist with strengthening/mobility based on AM PAC & JH-HLM score  - Consult OT/PT to assist with strengthening/mobility   - Keep Call bell within reach  - Keep bed low and locked with side rails adjusted as appropriate  - Keep care items and personal belongings within reach  - Initiate and maintain comfort rounds  Outcome: Progressing  Goal: Maintain or return to baseline ADL function  Description: INTERVENTIONS:  -  Assess patient's ability to carry out ADLs; assess patient's baseline for ADL function and identify physical deficits which impact ability to perform ADLs (bathing, care of mouth/teeth, toileting, grooming, dressing, etc.)  - Assess/evaluate cause of self-care deficits   - Assess range of motion  - Assess patient's mobility; develop plan if impaired  - Assess patient's need for assistive devices and provide as appropriate  - Encourage maximum independence but intervene and supervise when necessary  - Involve family in performance of ADLs  - Assess for home care needs following discharge   - Consider OT consult to assist with ADL evaluation and planning for discharge  - Provide patient education as appropriate  - Monitor functional capacity and physical performance, use of AM PAC & JH-HLM   - Monitor gait, balance and fatigue with ambulation    Outcome: Progressing  Goal: Maintains/Returns to pre admission functional level  Description: INTERVENTIONS:  - Perform AM-PAC 6 Click Basic Mobility/ Daily Activity assessment daily.  - Set and communicate daily mobility goal to care team and patient/family/caregiver.   - Collaborate with rehabilitation services on mobility goals if consulted  - Out of bed for toileting  - Record patient progress and toleration of activity level   Outcome: Progressing     Problem: DISCHARGE PLANNING  Goal: Discharge to home or other facility with appropriate resources  Description: INTERVENTIONS:  - Identify barriers to discharge w/patient and  caregiver  - Arrange for needed discharge resources and transportation as appropriate  - Identify discharge learning needs (meds, wound care, etc.)  - Arrange for interpretive services to assist at discharge as needed  - Refer to Case Management Department for coordinating discharge planning if the patient needs post-hospital services based on physician/advanced practitioner order or complex needs related to functional status, cognitive ability, or social support system  Outcome: Progressing     Problem: Knowledge Deficit  Goal: Patient/family/caregiver demonstrates understanding of disease process, treatment plan, medications, and discharge instructions  Description: Complete learning assessment and assess knowledge base.  Interventions:  - Provide teaching at level of understanding  - Provide teaching via preferred learning methods  Outcome: Progressing  Goal: Verbalizes understanding of labor plan  Description: Assess patient/family/caregiver's baseline knowledge level and ability to understand information.  Provide education via patient/family/caregiver's preferred learning method at appropriate level of understanding.     1. Provide teaching at level of understanding.  2. Provide teaching via preferred learning method(s).  Outcome: Progressing     Problem: Labor & Delivery  Goal: Manages discomfort  Description: Assess and monitor for signs and symptoms of discomfort.  Assess patient's pain level regularly and per hospital policy.  Administer medications as ordered. Support use of nonpharmacological methods to help control pain such as distraction, imagery, relaxation, and application of heat and cold.  Collaborate with interdisciplinary team and patient to determine appropriate pain management plan.    1. Include patient in decisions related to comfort.  2. Offer non-pharmacological pain management interventions.  3. Report ineffective pain management to physician.  Outcome: Progressing  Goal: Patient vital  signs are stable  Description: 1. Assess vital signs - vaginal delivery.  Outcome: Progressing

## 2025-06-10 ENCOUNTER — TELEPHONE (OUTPATIENT)
Dept: OBGYN CLINIC | Facility: CLINIC | Age: 25
End: 2025-06-10

## 2025-06-10 LAB
ERYTHROCYTE [DISTWIDTH] IN BLOOD BY AUTOMATED COUNT: 14.9 % (ref 11.6–15.1)
HCT VFR BLD AUTO: 30.3 % (ref 34.8–46.1)
HGB BLD-MCNC: 9.6 G/DL (ref 11.5–15.4)
MCH RBC QN AUTO: 27.1 PG (ref 26.8–34.3)
MCHC RBC AUTO-ENTMCNC: 31.7 G/DL (ref 31.4–37.4)
MCV RBC AUTO: 86 FL (ref 82–98)
PLATELET # BLD AUTO: 126 THOUSANDS/UL (ref 149–390)
PMV BLD AUTO: 13 FL (ref 8.9–12.7)
RBC # BLD AUTO: 3.54 MILLION/UL (ref 3.81–5.12)
WBC # BLD AUTO: 15.93 THOUSAND/UL (ref 4.31–10.16)

## 2025-06-10 PROCEDURE — NC001 PR NO CHARGE: Performed by: OBSTETRICS & GYNECOLOGY

## 2025-06-10 PROCEDURE — 85027 COMPLETE CBC AUTOMATED: CPT | Performed by: OBSTETRICS & GYNECOLOGY

## 2025-06-10 PROCEDURE — 59400 OBSTETRICAL CARE: CPT | Performed by: OBSTETRICS & GYNECOLOGY

## 2025-06-10 RX ORDER — FERROUS SULFATE 325(65) MG
325 TABLET ORAL
Status: DISCONTINUED | OUTPATIENT
Start: 2025-06-10 | End: 2025-06-11 | Stop reason: HOSPADM

## 2025-06-10 RX ORDER — ASCORBIC ACID 500 MG
500 TABLET ORAL DAILY
Status: DISCONTINUED | OUTPATIENT
Start: 2025-06-10 | End: 2025-06-11 | Stop reason: HOSPADM

## 2025-06-10 RX ORDER — LEVOTHYROXINE SODIUM 75 UG/1
150 TABLET ORAL
Status: DISCONTINUED | OUTPATIENT
Start: 2025-06-10 | End: 2025-06-11 | Stop reason: HOSPADM

## 2025-06-10 RX ADMIN — FERROUS SULFATE TAB 325 MG (65 MG ELEMENTAL FE) 325 MG: 325 (65 FE) TAB at 08:59

## 2025-06-10 RX ADMIN — LEVOTHYROXINE SODIUM 150 MCG: 0.07 TABLET ORAL at 05:58

## 2025-06-10 RX ADMIN — IBUPROFEN 600 MG: 600 TABLET ORAL at 03:02

## 2025-06-10 RX ADMIN — IBUPROFEN 600 MG: 600 TABLET ORAL at 08:59

## 2025-06-10 RX ADMIN — IBUPROFEN 600 MG: 600 TABLET ORAL at 15:06

## 2025-06-10 RX ADMIN — OXYCODONE HYDROCHLORIDE AND ACETAMINOPHEN 500 MG: 500 TABLET ORAL at 09:13

## 2025-06-10 RX ADMIN — ACETAMINOPHEN 650 MG: 325 TABLET, FILM COATED ORAL at 12:14

## 2025-06-10 RX ADMIN — DOCUSATE SODIUM 100 MG: 100 CAPSULE, LIQUID FILLED ORAL at 08:59

## 2025-06-10 RX ADMIN — DOCUSATE SODIUM 100 MG: 100 CAPSULE, LIQUID FILLED ORAL at 17:41

## 2025-06-10 RX ADMIN — ACETAMINOPHEN 650 MG: 325 TABLET, FILM COATED ORAL at 17:39

## 2025-06-10 RX ADMIN — IBUPROFEN 600 MG: 600 TABLET ORAL at 20:31

## 2025-06-10 RX ADMIN — ACETAMINOPHEN 650 MG: 325 TABLET, FILM COATED ORAL at 06:07

## 2025-06-10 NOTE — LACTATION NOTE
This note was copied from a baby's chart.  CONSULT - LACTATION  Baby Girl (Stew Dolan 1 days female MRN: [id number]    [facility] NURSERY Room / Bed: (N)/(N) Encounter: [id number]    Maternal Information     MOTHER:  Sultana Dolan  Maternal Age: 25 y.o.  OB History: # 1 - Date: 25, Sex: Female, Weight: 3430 g (7 lb 9 oz), GA: 39w3d, Type: Vaginal, Spontaneous, Apgar1: 7, Apgar5: 9, Living: Living, Birth Comments: meconium.   Previous breast reduction surgery? No    Lactation history:   Has patient previously breast fed: No   How long had patient previously breast fed:     Previous breast feeding complications:       Past Surgical History:   Procedure Laterality Date    ANTERIOR CRUCIATE LIGAMENT REPAIR      and     WISDOM TOOTH EXTRACTION         Birth information:  YOB: 2023   Time of birth: 6:29 PM   Sex: female   Delivery type: Vaginal, Spontaneous   Birth Weight: 3430 g (7 lb 9 oz)   Percent of Weight Change: -2%     Gestational Age: 39w3d   [unfilled]    Reason for Consult:    Reason for Consult: Initial assessment (ext) - 20 min, Latch Assess (ext) - 20 min, Discharge (routine) - 10 min    Risk Factors:         Breast and nipple assessment:   Breasts/Nipples  Left Breast: Soft  Right Breast: Soft  Left Nipple: Everted  Right Nipple: Everted  Intervention: Other (comment), Hand expression, Breast pump (Overview of establishing breastfeeding & support available)  Breastfeeding Status: Yes  Breastfeeding Progress: Not yet established    OB Lactation Tools:    Other OB Lactation Tools  Feeding Devices: Pump, Syringe    Breast Pump:    Breast Pump  Pump: Manual, Personal (has Quentin from Insurance)  Pump Review/Education: Milk storage  Initiated by:  (Mom using personal Hand pump)      [place] Assessment: normal assessment    Feeding assessment: feeding well with guidance     LATCH:  Latch: Grasps breast, tongue down, lips  flanged, rhythmic sucking   Audible Swallowing: A few with stimulation   Type of Nipple: Everted (After stimulation)   Comfort (Breast/Nipple): Soft/non-tender   Hold (Positioning): Partial assist, teach one side, mother does other, staff holds   LATCH Score: 8       Steve:                   Feeding recommendations:  breast feed on demand       06/10/25 1300   Patient Follow-Up   Lactation Consult Status 2   Follow-Up Type Inpatient;Call as needed   Other OB Lactation Documentation    Additional Problem Noted Initial Visit - NEW Family - Overview of establishing breastfeeding & latch assistance  (Ready, Set Baby, Checklist for Essentials of Positioning & Latch; Congratulations on the Birth of Your Baby!' Waking a Sleepy Baby & Discharge Breastfeeding Handouts @ bedside)     Overview of establishing breastfeeding discharge breastfeeding booklet @ 0956 including the feeding log. Emphasized 8 or more (12) feedings in a 24 hour period, what to expect for the number of diapers per day of life and the progression of properties of the  stooling pattern.    List of reasons to call a lactation consultant.  Feeding logs  Feeding cues  Hand expression  Baby's Second day (cluster feeding)  Breastfeeding and Your Lifestyle (Medications, Alcohol, Caffeine, Smoking, Street Drugs, Methadone)  First Two Weeks Survival Guide for Breastfeeding  Breast Changes  Physical Therapy  Storage and Handling of Breast milk  How to Keep Your Breast Pump Kit Clean  The Employed Breastfeeding Mother  Mixed feeding  Bottle feeding like breastfeeding (paced bottle feeding)  astfeeding and your lifestyle, storage and preparation of breast milk, how to keep you breast pump clean, the employed breastfeeding mother and paced bottle feeding handouts.     Booklet included Breastfeeding Resources for after discharge including access to the number for the [facility].    Parents called back in for assistance feeding at breast.    Education on positioning and alignment. Mom is encouraged to:     - Bring baby up to the breast (use of pillows to elevate so baby's torso is against mom's breasts)   - Skin to skin for feedings with top hand exposed to show signs of satiation   - Chin deep into breast tissue (make baby look up to the nipple)   - nose aligned to the nipple   -Wait for wide gape, place chin behind the areola on the breast so nipple is at the nose. Once baby opens their mouth wide, the nipple will be aimed at the upper, back palate  - Cheeks should be touching breast, and baby should have a long neck   - Ear, shoulder, hip will be in alignment   - Deep, snug hold of baby up to the breast   - Every baby knows how to breathe at the breast. The tip of the nose may appear to touch the breast. Babies breathe from the side of the nasal passages. If baby can not breathe due to improper alignment, baby will unlatch    Mom chose left breast to start. Encouraged football hold to work on deep latch. Worked on positioning infant up at chest level and starting to feed infant with nose arriving at the nipple. Then, using areolar compression to achieve a deep latch that is comfortable and exchanges optimum amounts of milk.  Guided dyad to deep latch. Used breast compressions & stimulation to keep active suckling till popped off with relaxed tone. Nursing parent  was able to note signs of a good feeding like: less discomfort after latch on tenderness, good rocker suckling with stimulation, breast softening; rounded nipple and relaxed tone after nursing at breast.  Father also shown signs of good latch /feed.  Dad burped baby. Then to right breast also using football hold. Again guided dyad to deep latch. Mom is able to maintain hold. Left family to bond & build confidence. Parents seem pleased with session.       Encouraged parents to call for assistance, questions, and concerns about breastfeeding.           Trini Vaughan RN 6/10/2025 1:48 PM

## 2025-06-10 NOTE — PLAN OF CARE
Problem: PAIN - ADULT  Goal: Verbalizes/displays adequate comfort level or baseline comfort level  Description: Interventions:  - Encourage patient to monitor pain and request assistance  - Assess pain using appropriate pain scale  - Administer analgesics as ordered based on type and severity of pain and evaluate response  - Implement non-pharmacological measures as appropriate and evaluate response  - Consider cultural and social influences on pain and pain management  - Notify physician/advanced practitioner if interventions unsuccessful or patient reports new pain  - Educate patient/family on pain management process including their role and importance of  reporting pain   - Provide non-pharmacologic/complimentary pain relief interventions  Outcome: Progressing     Problem: INFECTION - ADULT  Goal: Absence or prevention of progression during hospitalization  Description: INTERVENTIONS:  - Assess and monitor for signs and symptoms of infection  - Monitor lab/diagnostic results  - Monitor all insertion sites, i.e. indwelling lines, tubes, and drains  - Monitor endotracheal if appropriate and nasal secretions for changes in amount and color  - [place] appropriate cooling/warming therapies per order  - Administer medications as ordered  - Instruct and encourage patient and family to use good hand hygiene technique  - Identify and instruct in appropriate isolation precautions for identified infection/condition  Outcome: Progressing  Goal: Absence of fever/infection during neutropenic period  Description: INTERVENTIONS:  - Monitor WBC  - Perform strict hand hygiene  - Limit to healthy visitors only  - No plants, dried, fresh or silk flowers with moreno in patient room  Outcome: Progressing     Problem: SAFETY ADULT  Goal: Patient will remain free of falls  Description: INTERVENTIONS:  - Educate patient/family on patient safety including physical limitations  - Instruct patient to call for assistance with activity   -  Consider consulting OT/PT to assist with strengthening/mobility based on AM PAC & JH-HLM score  - Consult OT/PT to assist with strengthening/mobility   - Keep Call bell within reach  - Keep bed low and locked with side rails adjusted as appropriate  - Keep care items and personal belongings within reach  - Initiate and maintain comfort rounds  Outcome: Progressing  Goal: Maintain or return to baseline ADL function  Description: INTERVENTIONS:  -  Assess patient's ability to carry out ADLs; assess patient's baseline for ADL function and identify physical deficits which impact ability to perform ADLs (bathing, care of mouth/teeth, toileting, grooming, dressing, etc.)  - Assess/evaluate cause of self-care deficits   - Assess range of motion  - Assess patient's mobility; develop plan if impaired  - Assess patient's need for assistive devices and provide as appropriate  - Encourage maximum independence but intervene and supervise when necessary  - Involve family in performance of ADLs  - Assess for home care needs following discharge   - Consider OT consult to assist with ADL evaluation and planning for discharge  - Provide patient education as appropriate  - Monitor functional capacity and physical performance, use of AM PAC & JH-HLM   - Monitor gait, balance and fatigue with ambulation    Outcome: Progressing  Goal: Maintains/Returns to pre admission functional level  Description: INTERVENTIONS:  - Perform AM-PAC 6 Click Basic Mobility/ Daily Activity assessment daily.  - Set and communicate daily mobility goal to care team and patient/family/caregiver.   - Collaborate with rehabilitation services on mobility goals if consulted  - Out of bed for toileting  - Record patient progress and toleration of activity level   Outcome: Progressing     Problem: DISCHARGE PLANNING  Goal: Discharge to home or other facility with appropriate resources  Description: INTERVENTIONS:  - Identify barriers to discharge w/patient and  caregiver  - Arrange for needed discharge resources and transportation as appropriate  - Identify discharge learning needs (meds, wound care, etc.)  - Arrange for interpretive services to assist at discharge as needed  - Refer to Case Management Department for coordinating discharge planning if the patient needs post-hospital services based on physician/advanced practitioner order or complex needs related to functional status, cognitive ability, or social support system  Outcome: Progressing     Problem: Knowledge Deficit  Goal: Patient/family/caregiver demonstrates understanding of disease process, treatment plan, medications, and discharge instructions  Description: Complete learning assessment and assess knowledge base.  Interventions:  - Provide teaching at level of understanding  - Provide teaching via preferred learning methods  Outcome: Progressing  Goal: Verbalizes understanding of labor plan  Description: Assess patient/family/caregiver's baseline knowledge level and ability to understand information.  Provide education via patient/family/caregiver's preferred learning method at appropriate level of understanding.     1. Provide teaching at level of understanding.  2. Provide teaching via preferred learning method(s).  Outcome: Progressing     Problem: Labor & Delivery  Goal: Manages discomfort  Description: Assess and monitor for signs and symptoms of discomfort.  Assess patient's pain level regularly and per hospital policy.  Administer medications as ordered. Support use of nonpharmacological methods to help control pain such as distraction, imagery, relaxation, and application of heat and cold.  Collaborate with interdisciplinary team and patient to determine appropriate pain management plan.    1. Include patient in decisions related to comfort.  2. Offer non-pharmacological pain management interventions.  3. Report ineffective pain management to physician.  Outcome: Progressing  Goal: Patient vital  signs are stable  Description: 1. Assess vital signs - vaginal delivery.  Outcome: Progressing

## 2025-06-10 NOTE — ANESTHESIA POSTPROCEDURE EVALUATION
Post-Op Assessment Note    CV Status:  Stable    Pain management: adequate      Post-op block assessment: catheter intact and no complications   Mental Status:  Alert and awake   Hydration Status:  Euvolemic   PONV Controlled:  Controlled   Airway Patency:  Patent     Post Op Vitals Reviewed: Yes    No anethesia notable event occurred.    Staff: Anesthesiologist           Last Filed PACU Vitals:  Vitals Value Taken Time   Temp     Pulse 90 06/09/25 20:30   BP 95/53 06/09/25 20:30   Resp     SpO2     Vitals shown include unfiled device data.

## 2025-06-10 NOTE — TELEPHONE ENCOUNTER
POSTPARTUM PHONE CALL ASSESSMENT - left message patient's VM & My Chart message sent to patient 6/10/2025    Date of Delivery: 2025  Delivering Provider: Dr Kandi Rodriguez  Mode:   Delivery Notes/Complications:     Do you still have bleeding/pain? If so, how much/how severe?     Regular BMs/Urination?     Breastfeeding/Formula/Both?     How are you doing emotionally?     Do you have any other questions or concerns for us or your provider?     Have you scheduled the pediatrician appointment with pediatrician?     Do you have a postpartum visit scheduled? no

## 2025-06-10 NOTE — PLAN OF CARE
Problem: PAIN - ADULT  Goal: Verbalizes/displays adequate comfort level or baseline comfort level  Description: Interventions:  - Encourage patient to monitor pain and request assistance  - Assess pain using appropriate pain scale  - Administer analgesics as ordered based on type and severity of pain and evaluate response  - Implement non-pharmacological measures as appropriate and evaluate response  - Consider cultural and social influences on pain and pain management  - Notify physician/advanced practitioner if interventions unsuccessful or patient reports new pain  - Educate patient/family on pain management process including their role and importance of  reporting pain   - Provide non-pharmacologic/complimentary pain relief interventions  Outcome: Progressing     Problem: INFECTION - ADULT  Goal: Absence or prevention of progression during hospitalization  Description: INTERVENTIONS:  - Assess and monitor for signs and symptoms of infection  - Monitor lab/diagnostic results  - Monitor all insertion sites, i.e. indwelling lines, tubes, and drains  - Monitor endotracheal if appropriate and nasal secretions for changes in amount and color  - [place] appropriate cooling/warming therapies per order  - Administer medications as ordered  - Instruct and encourage patient and family to use good hand hygiene technique  - Identify and instruct in appropriate isolation precautions for identified infection/condition  Outcome: Progressing  Goal: Absence of fever/infection during neutropenic period  Description: INTERVENTIONS:  - Monitor WBC  - Perform strict hand hygiene  - Limit to healthy visitors only  - No plants, dried, fresh or silk flowers with moreno in patient room  Outcome: Progressing     Problem: SAFETY ADULT  Goal: Patient will remain free of falls  Description: INTERVENTIONS:  - Educate patient/family on patient safety including physical limitations  - Instruct patient to call for assistance with activity   -  Consider consulting OT/PT to assist with strengthening/mobility based on AM PAC & JH-HLM score  - Consult OT/PT to assist with strengthening/mobility   - Keep Call bell within reach  - Keep bed low and locked with side rails adjusted as appropriate  - Keep care items and personal belongings within reach  - Initiate and maintain comfort rounds  Outcome: Progressing  Goal: Maintain or return to baseline ADL function  Description: INTERVENTIONS:  -  Assess patient's ability to carry out ADLs; assess patient's baseline for ADL function and identify physical deficits which impact ability to perform ADLs (bathing, care of mouth/teeth, toileting, grooming, dressing, etc.)  - Assess/evaluate cause of self-care deficits   - Assess range of motion  - Assess patient's mobility; develop plan if impaired  - Assess patient's need for assistive devices and provide as appropriate  - Encourage maximum independence but intervene and supervise when necessary  - Involve family in performance of ADLs  - Assess for home care needs following discharge   - Consider OT consult to assist with ADL evaluation and planning for discharge  - Provide patient education as appropriate  - Monitor functional capacity and physical performance, use of AM PAC & JH-HLM   - Monitor gait, balance and fatigue with ambulation    Outcome: Progressing  Goal: Maintains/Returns to pre admission functional level  Description: INTERVENTIONS:  - Perform AM-PAC 6 Click Basic Mobility/ Daily Activity assessment daily.  - Set and communicate daily mobility goal to care team and patient/family/caregiver.   - Collaborate with rehabilitation services on mobility goals if consulted  - Out of bed for toileting  - Record patient progress and toleration of activity level   Outcome: Progressing     Problem: DISCHARGE PLANNING  Goal: Discharge to home or other facility with appropriate resources  Description: INTERVENTIONS:  - Identify barriers to discharge w/patient and  caregiver  - Arrange for needed discharge resources and transportation as appropriate  - Identify discharge learning needs (meds, wound care, etc.)  - Arrange for interpretive services to assist at discharge as needed  - Refer to Case Management Department for coordinating discharge planning if the patient needs post-hospital services based on physician/advanced practitioner order or complex needs related to functional status, cognitive ability, or social support system  Outcome: Progressing     Problem: Knowledge Deficit  Goal: Patient/family/caregiver demonstrates understanding of disease process, treatment plan, medications, and discharge instructions  Description: Complete learning assessment and assess knowledge base.  Interventions:  - Provide teaching at level of understanding  - Provide teaching via preferred learning methods  Outcome: Progressing  Goal: Verbalizes understanding of labor plan  Description: Assess patient/family/caregiver's baseline knowledge level and ability to understand information.  Provide education via patient/family/caregiver's preferred learning method at appropriate level of understanding.     1. Provide teaching at level of understanding.  2. Provide teaching via preferred learning method(s).  Outcome: Progressing     Problem: Labor & Delivery  Goal: Manages discomfort  Description: Assess and monitor for signs and symptoms of discomfort.  Assess patient's pain level regularly and per hospital policy.  Administer medications as ordered. Support use of nonpharmacological methods to help control pain such as distraction, imagery, relaxation, and application of heat and cold.  Collaborate with interdisciplinary team and patient to determine appropriate pain management plan.    1. Include patient in decisions related to comfort.  2. Offer non-pharmacological pain management interventions.  3. Report ineffective pain management to physician.  Outcome: Progressing  Goal: Patient vital  signs are stable  Description: 1. Assess vital signs - vaginal delivery.  Outcome: Progressing

## 2025-06-10 NOTE — L&D DELIVERY NOTE
Delivery Note  Sultana Dolan; 25 y.o. female; MRN: [id number]  Unit/Bed#: -01; Encounter: [id number]    Time of delivery: 2025  6:29 PM    Preop Diagnoses:  (1) Pregnancy at 39w3d gestational age    (2) suspected macrosomia    (3) thyroid dysfunction    (4) GBS neg    (5) Rh pos     Postop diagnoses:  (1) 3430 g (7 lb 9 oz) female , apgars 7  / 9 , born 2025  6:29 PM, delivered    (2) suspected macrosomia    (3) thyroid dysfunction    (4) GBS neg    (5) Rh pos    Procedure:  Delivery - Vaginal, Spontaneous    Obstetrician:  KACIE KITCHEN  Anesthesia:  Epidural  QBL:  Non-Surgical QBL (mL): 64 cc  Specimens:  (1) Cord blood    (2) Cord gases    (3) Placenta    Findings:    (1) 3430 g (7 lb 9 oz) female , apgars 7  / 9     (2) Intact placenta, cord with 3 vessels     Complications:  None.  Dispo:  Stable.    Procedure in detail:    Complete and .  The patient pushed and delivered the fetal vertex Middle Occiput Anterior.  With next maternal expulsive effort, the right anterior shoulder delivered easily.  A nuchal cord was not noted.    The rest of the infant followed without difficulty.  The infant was noted to be a vigorous 3430 g (7 lb 9 oz) baby girl.  Transferred immediately to the maternal abdomen.  After 30 seconds, the cord was clamped x 2 and cut.      Cord gases were collected.  Cord blood was collected.    Third stage of labor resulted in the Spontaneous delivery of the placenta, which was inspected and noted to be Intact.  The cord was noted to have 3 vessels.  The placenta was sent for pathologic examination, but patient requesting to take placenta home.  Advised NOT to ingest placenta due to meconium. Uterine massage was performed, after which the uterus was noted to be firm.    The birth canal and perineum were inspected.  A 2° laceration was noted, which was repaired in the usual fashion.  Small left labial separation at hymenal ring repaired with 4-0 Vicryl  in figure of 8 suture.  Following the repair, there was excellent hemostasis and cosmesis.    Ms. Dolan did well following the procedure.  Mother and baby were left in stable condition.        KACIE Rodriguez MD, FACOG

## 2025-06-10 NOTE — PROGRESS NOTES
Progress Note - OB/GYN  Post-Partum Physician Note   Sultana Dolan 25 y.o. female MRN: [id number]  Unit/Bed#:  209-01 Encounter: [id number]    Assessment:  25 y.o. year-old , postpartum day #1 s/p     Assessment & Plan   (spontaneous vaginal delivery)  - doing well postpartum, routine PP care  - baby girl Apgars 7, 9 weighing 3930g  - breastfeeding  Abnormal glucose affecting pregnancy  Thyroid disease affecting pregnancy  - managed by PCP  - on levothyroxine 150 mcg daily  Anemia affecting pregnancy in third trimester  - Hgb 11.6 > 9.6 postpartum  - will initiate ferrous sulfate daily along with vitamin C  Bipolar 1 disorder (HCC)  - stable, no meds  - counselor in [place]  Enlarged pituitary gland (HCC)        _________________________________    Subjective:   No acute events overnight. Denies HA, visual changes, epigastric pain. Denies shortness of breath or chest pain. Ambulating and voiding without difficulty.  Good urine output. Moderate lochia.      Objective:   Vitals:    25 2100 25 2211 06/10/25 0302 06/10/25 0808   BP: 97/56 94/50 (!) 84/51 111/66   BP Location:  Right arm Right arm Left arm   Pulse: 77 63 62 62   Resp:  18 18 17   Temp:  97.8 °F (36.6 °C) 98.2 °F (36.8 °C) 98.2 °F (36.8 °C)   TempSrc:  Temporal Oral Temporal   SpO2:  99% 98% 97%   Weight:       Height:           Intake/Output Summary (Last 24 hours) at 6/10/2025 0828  Last data filed at 6/10/2025 0100  Gross per 24 hour   Intake --   Output 2764 ml   Net -2764 ml       Physical Exam:  General: AOx3, NAD  Lungs: CTAB  CV: RRR  Abdomen: patient changing infant diaper, abdomen not examined  Extremities: nontender without edema bilaterally      Labs/Tests:   Lab Results   Component Value Date/Time    HGB 9.6 (L) 06/10/2025 06:03 AM    HGB 11.6 2025 04:08 AM     (L) 06/10/2025 06:03 AM     2025 04:08 AM    WBC 15.93 (H) 06/10/2025 06:03 AM    WBC 17.15 (H) 2025 04:08 AM     "    Brief OB Lab review:  ABO Grouping   Date Value Ref Range Status   06/09/2025 O  Final      Rh Factor   Date Value Ref Range Status   06/09/2025 Positive  Final     Rh Type   Date Value Ref Range Status   12/30/2024 Positive  Final     Comment:     Please note: Prior records for this patient's ABO / Rh type are not  available for additional verification.      No results found for: \"ANTIBODYSCR\"  No results found for: \"RUBM\"    MEDS:     Current Facility-Administered Medications:     acetaminophen (TYLENOL) tablet 650 mg, Q4H PRN    ascorbic acid (VITAMIN C) tablet 500 mg, Daily    benzocaine-menthol-lanolin-aloe (DERMOPLAST) 20-0.5 % topical spray 1 Application, Q6H PRN    diphenhydrAMINE (BENADRYL) injection 25 mg, Q6H PRN    docusate sodium (COLACE) capsule 100 mg, BID    ferrous sulfate tablet 325 mg, Daily With Breakfast    hydrocortisone 1 % cream 1 Application, Daily PRN    ibuprofen (MOTRIN) tablet 600 mg, Q6H    lactated ringers infusion, Continuous, Last Rate: 999 mL/hr (06/09/25 0935)    levothyroxine tablet 150 mcg, Early Morning    ondansetron (ZOFRAN) injection 4 mg, Q8H PRN    ropivacaine 0.2% PCEA, Continuous    simethicone (MYLICON) chewable tablet 80 mg, 4x Daily PRN    witch hazel-glycerin (TUCKS) topical pad 1 Pad, Q4H PRN    Facility-Administered Medications Ordered in Other Encounters:     Lidocaine-EPINEPHrine (PF) (XYLOCAINE-MPF/EPINEPHRINE) 1.5 %-1:200,000 injection, PRN    phenylephrine 1,000 mcg/10 mL prefilled syringe, PRN  Invasive Devices       Peripheral Intravenous Line  Duration             Peripheral IV 06/09/25 Dorsal (posterior);Right Forearm 1 day                      Chey Moore MD  6/10/2025 8:28 AM             "

## 2025-06-11 VITALS
SYSTOLIC BLOOD PRESSURE: 108 MMHG | TEMPERATURE: 97.9 F | BODY MASS INDEX: 34.23 KG/M2 | DIASTOLIC BLOOD PRESSURE: 58 MMHG | OXYGEN SATURATION: 97 % | HEIGHT: 62 IN | RESPIRATION RATE: 17 BRPM | HEART RATE: 51 BPM | WEIGHT: 186 LBS

## 2025-06-11 LAB — BACTERIA UR CULT: NORMAL

## 2025-06-11 PROCEDURE — NC001 PR NO CHARGE: Performed by: STUDENT IN AN ORGANIZED HEALTH CARE EDUCATION/TRAINING PROGRAM

## 2025-06-11 PROCEDURE — 99024 POSTOP FOLLOW-UP VISIT: CPT | Performed by: STUDENT IN AN ORGANIZED HEALTH CARE EDUCATION/TRAINING PROGRAM

## 2025-06-11 RX ORDER — IBUPROFEN 200 MG
600 TABLET ORAL EVERY 6 HOURS PRN
Start: 2025-06-11 | End: 2025-06-28

## 2025-06-11 RX ORDER — DOCUSATE SODIUM 100 MG/1
100 CAPSULE, LIQUID FILLED ORAL 2 TIMES DAILY PRN
Start: 2025-06-11

## 2025-06-11 RX ORDER — ACETAMINOPHEN 325 MG/1
650 TABLET ORAL EVERY 4 HOURS PRN
Start: 2025-06-11

## 2025-06-11 RX ORDER — ASCORBIC ACID 500 MG
500 TABLET ORAL DAILY
Start: 2025-06-12 | End: 2025-06-28

## 2025-06-11 RX ADMIN — OXYCODONE HYDROCHLORIDE AND ACETAMINOPHEN 500 MG: 500 TABLET ORAL at 08:53

## 2025-06-11 RX ADMIN — LEVOTHYROXINE SODIUM 150 MCG: 0.07 TABLET ORAL at 06:11

## 2025-06-11 RX ADMIN — DOCUSATE SODIUM 100 MG: 100 CAPSULE, LIQUID FILLED ORAL at 08:53

## 2025-06-11 RX ADMIN — ACETAMINOPHEN 650 MG: 325 TABLET, FILM COATED ORAL at 00:55

## 2025-06-11 RX ADMIN — ACETAMINOPHEN 650 MG: 325 TABLET, FILM COATED ORAL at 06:10

## 2025-06-11 RX ADMIN — IBUPROFEN 600 MG: 600 TABLET ORAL at 08:53

## 2025-06-11 RX ADMIN — FERROUS SULFATE TAB 325 MG (65 MG ELEMENTAL FE) 325 MG: 325 (65 FE) TAB at 07:31

## 2025-06-11 RX ADMIN — SIMETHICONE 80 MG: 80 TABLET, CHEWABLE ORAL at 08:56

## 2025-06-11 NOTE — PLAN OF CARE
Problem: PAIN - ADULT  Goal: Verbalizes/displays adequate comfort level or baseline comfort level  Description: Interventions:  - Encourage patient to monitor pain and request assistance  - Assess pain using appropriate pain scale  - Administer analgesics as ordered based on type and severity of pain and evaluate response  - Implement non-pharmacological measures as appropriate and evaluate response  - Consider cultural and social influences on pain and pain management  - Notify physician/advanced practitioner if interventions unsuccessful or patient reports new pain  - Educate patient/family on pain management process including their role and importance of  reporting pain   - Provide non-pharmacologic/complimentary pain relief interventions  Outcome: Progressing     Problem: INFECTION - ADULT  Goal: Absence or prevention of progression during hospitalization  Description: INTERVENTIONS:  - Assess and monitor for signs and symptoms of infection  - Monitor lab/diagnostic results  - Monitor all insertion sites, i.e. indwelling lines, tubes, and drains  - Monitor endotracheal if appropriate and nasal secretions for changes in amount and color  - Pierre appropriate cooling/warming therapies per order  - Administer medications as ordered  - Instruct and encourage patient and family to use good hand hygiene technique  - Identify and instruct in appropriate isolation precautions for identified infection/condition  Outcome: Progressing  Goal: Absence of fever/infection during neutropenic period  Description: INTERVENTIONS:  - Monitor WBC  - Perform strict hand hygiene  - Limit to healthy visitors only  - No plants, dried, fresh or silk flowers with moreno in patient room  Outcome: Progressing     Problem: SAFETY ADULT  Goal: Patient will remain free of falls  Description: INTERVENTIONS:  - Educate patient/family on patient safety including physical limitations  - Instruct patient to call for assistance with activity   -  Consider consulting OT/PT to assist with strengthening/mobility based on AM PAC & JH-HLM score  - Consult OT/PT to assist with strengthening/mobility   - Keep Call bell within reach  - Keep bed low and locked with side rails adjusted as appropriate  - Keep care items and personal belongings within reach  - Initiate and maintain comfort rounds  Outcome: Progressing  Goal: Maintain or return to baseline ADL function  Description: INTERVENTIONS:  -  Assess patient's ability to carry out ADLs; assess patient's baseline for ADL function and identify physical deficits which impact ability to perform ADLs (bathing, care of mouth/teeth, toileting, grooming, dressing, etc.)  - Assess/evaluate cause of self-care deficits   - Assess range of motion  - Assess patient's mobility; develop plan if impaired  - Assess patient's need for assistive devices and provide as appropriate  - Encourage maximum independence but intervene and supervise when necessary  - Involve family in performance of ADLs  - Assess for home care needs following discharge   - Consider OT consult to assist with ADL evaluation and planning for discharge  - Provide patient education as appropriate  - Monitor functional capacity and physical performance, use of AM PAC & JH-HLM   - Monitor gait, balance and fatigue with ambulation    Outcome: Progressing  Goal: Maintains/Returns to pre admission functional level  Description: INTERVENTIONS:  - Perform AM-PAC 6 Click Basic Mobility/ Daily Activity assessment daily.  - Set and communicate daily mobility goal to care team and patient/family/caregiver.   - Collaborate with rehabilitation services on mobility goals if consulted  - Out of bed for toileting  - Record patient progress and toleration of activity level   Outcome: Progressing     Problem: DISCHARGE PLANNING  Goal: Discharge to home or other facility with appropriate resources  Description: INTERVENTIONS:  - Identify barriers to discharge w/patient and  caregiver  - Arrange for needed discharge resources and transportation as appropriate  - Identify discharge learning needs (meds, wound care, etc.)  - Arrange for interpretive services to assist at discharge as needed  - Refer to Case Management Department for coordinating discharge planning if the patient needs post-hospital services based on physician/advanced practitioner order or complex needs related to functional status, cognitive ability, or social support system  Outcome: Progressing     Problem: Knowledge Deficit  Goal: Patient/family/caregiver demonstrates understanding of disease process, treatment plan, medications, and discharge instructions  Description: Complete learning assessment and assess knowledge base.  Interventions:  - Provide teaching at level of understanding  - Provide teaching via preferred learning methods  Outcome: Progressing  Goal: Verbalizes understanding of labor plan  Description: Assess patient/family/caregiver's baseline knowledge level and ability to understand information.  Provide education via patient/family/caregiver's preferred learning method at appropriate level of understanding.     1. Provide teaching at level of understanding.  2. Provide teaching via preferred learning method(s).  Outcome: Progressing     Problem: Labor & Delivery  Goal: Manages discomfort  Description: Assess and monitor for signs and symptoms of discomfort.  Assess patient's pain level regularly and per hospital policy.  Administer medications as ordered. Support use of nonpharmacological methods to help control pain such as distraction, imagery, relaxation, and application of heat and cold.  Collaborate with interdisciplinary team and patient to determine appropriate pain management plan.    1. Include patient in decisions related to comfort.  2. Offer non-pharmacological pain management interventions.  3. Report ineffective pain management to physician.  Outcome: Progressing  Goal: Patient vital  signs are stable  Description: 1. Assess vital signs - vaginal delivery.  Outcome: Progressing

## 2025-06-11 NOTE — PLAN OF CARE
Problem: PAIN - ADULT  Goal: Verbalizes/displays adequate comfort level or baseline comfort level  Description: Interventions:  - Encourage patient to monitor pain and request assistance  - Assess pain using appropriate pain scale  - Administer analgesics as ordered based on type and severity of pain and evaluate response  - Implement non-pharmacological measures as appropriate and evaluate response  - Consider cultural and social influences on pain and pain management  - Notify physician/advanced practitioner if interventions unsuccessful or patient reports new pain  - Educate patient/family on pain management process including their role and importance of  reporting pain   - Provide non-pharmacologic/complimentary pain relief interventions  Outcome: Progressing     Problem: INFECTION - ADULT  Goal: Absence or prevention of progression during hospitalization  Description: INTERVENTIONS:  - Assess and monitor for signs and symptoms of infection  - Monitor lab/diagnostic results  - Monitor all insertion sites, i.e. indwelling lines, tubes, and drains  - Monitor endotracheal if appropriate and nasal secretions for changes in amount and color  - [place] appropriate cooling/warming therapies per order  - Administer medications as ordered  - Instruct and encourage patient and family to use good hand hygiene technique  - Identify and instruct in appropriate isolation precautions for identified infection/condition  Outcome: Progressing  Goal: Absence of fever/infection during neutropenic period  Description: INTERVENTIONS:  - Monitor WBC  - Perform strict hand hygiene  - Limit to healthy visitors only  - No plants, dried, fresh or silk flowers with moreno in patient room  Outcome: Progressing     Problem: SAFETY ADULT  Goal: Patient will remain free of falls  Description: INTERVENTIONS:  - Educate patient/family on patient safety including physical limitations  - Instruct patient to call for assistance with activity   -  Consider consulting OT/PT to assist with strengthening/mobility based on AM PAC & JH-HLM score  - Consult OT/PT to assist with strengthening/mobility   - Keep Call bell within reach  - Keep bed low and locked with side rails adjusted as appropriate  - Keep care items and personal belongings within reach  - Initiate and maintain comfort rounds  Outcome: Progressing  Goal: Maintain or return to baseline ADL function  Description: INTERVENTIONS:  -  Assess patient's ability to carry out ADLs; assess patient's baseline for ADL function and identify physical deficits which impact ability to perform ADLs (bathing, care of mouth/teeth, toileting, grooming, dressing, etc.)  - Assess/evaluate cause of self-care deficits   - Assess range of motion  - Assess patient's mobility; develop plan if impaired  - Assess patient's need for assistive devices and provide as appropriate  - Encourage maximum independence but intervene and supervise when necessary  - Involve family in performance of ADLs  - Assess for home care needs following discharge   - Consider OT consult to assist with ADL evaluation and planning for discharge  - Provide patient education as appropriate  - Monitor functional capacity and physical performance, use of AM PAC & JH-HLM   - Monitor gait, balance and fatigue with ambulation    Outcome: Progressing  Goal: Maintains/Returns to pre admission functional level  Description: INTERVENTIONS:  - Perform AM-PAC 6 Click Basic Mobility/ Daily Activity assessment daily.  - Set and communicate daily mobility goal to care team and patient/family/caregiver.   - Collaborate with rehabilitation services on mobility goals if consulted  - Out of bed for toileting  - Record patient progress and toleration of activity level   Outcome: Progressing     Problem: DISCHARGE PLANNING  Goal: Discharge to home or other facility with appropriate resources  Description: INTERVENTIONS:  - Identify barriers to discharge w/patient and  caregiver  - Arrange for needed discharge resources and transportation as appropriate  - Identify discharge learning needs (meds, wound care, etc.)  - Arrange for interpretive services to assist at discharge as needed  - Refer to Case Management Department for coordinating discharge planning if the patient needs post-hospital services based on physician/advanced practitioner order or complex needs related to functional status, cognitive ability, or social support system  Outcome: Progressing     Problem: Knowledge Deficit  Goal: Patient/family/caregiver demonstrates understanding of disease process, treatment plan, medications, and discharge instructions  Description: Complete learning assessment and assess knowledge base.  Interventions:  - Provide teaching at level of understanding  - Provide teaching via preferred learning methods  Outcome: Progressing  Goal: Verbalizes understanding of labor plan  Description: Assess patient/family/caregiver's baseline knowledge level and ability to understand information.  Provide education via patient/family/caregiver's preferred learning method at appropriate level of understanding.     1. Provide teaching at level of understanding.  2. Provide teaching via preferred learning method(s).  Outcome: Progressing     Problem: Labor & Delivery  Goal: Manages discomfort  Description: Assess and monitor for signs and symptoms of discomfort.  Assess patient's pain level regularly and per hospital policy.  Administer medications as ordered. Support use of nonpharmacological methods to help control pain such as distraction, imagery, relaxation, and application of heat and cold.  Collaborate with interdisciplinary team and patient to determine appropriate pain management plan.    1. Include patient in decisions related to comfort.  2. Offer non-pharmacological pain management interventions.  3. Report ineffective pain management to physician.  Outcome: Progressing  Goal: Patient vital  signs are stable  Description: 1. Assess vital signs - vaginal delivery.  Outcome: Progressing

## 2025-06-11 NOTE — PROGRESS NOTES
Progress Note - OB/GYN   Name: Sultana Dolan 25 y.o. female I MRN: [id number]  Unit/Bed#: -01 I Date of Admission: 2025   Date of Service: 2025 I Hospital Day: 2    Assessment & Plan   (spontaneous vaginal delivery)  - Progressing appropriately postpartum. Continue routine postpartum care. Stable for discharge to home.   Abnormal glucose affecting pregnancy  Thyroid disease affecting pregnancy  - managed by PCP  - Continue levothyroxine 150 mcg daily  Anemia affecting pregnancy in third trimester  - Hgb 11.6 > 9.6 postpartum  - will initiate ferrous sulfate daily along with vitamin C  Bipolar 1 disorder (HCC)  - stable, no meds  - counselor in [place]  Enlarged pituitary gland (HCC)      OB Post-Partum Progress Note  Subjective   Post delivery. Patient is doing well. Lochia WNL. Pain well controlled.     Pain: yes, cramping, improved with meds  Tolerating PO: yes  Voiding: Yes  Ambulating: yes  Breastfeeding: Yes  Chest pain: no  Shortness of breath: no  Leg pain: no  Lochia: WNL    Objective :  Temp:  [97.8 °F (36.6 °C)-97.9 °F (36.6 °C)] 97.9 °F (36.6 °C)  HR:  [51-69] 51  BP: ()/(50-58) 108/58  Resp:  [16-17] 17  SpO2:  [97 %-98 %] 97 %  O2 Device: None (Room air)    Physical Exam  Vitals reviewed.   Constitutional:       General: She is not in acute distress.     Appearance: Normal appearance. She is not ill-appearing.     Cardiovascular:      Rate and Rhythm: Normal rate.   Pulmonary:      Effort: Pulmonary effort is normal.   Abdominal:      General: There is no distension.      Palpations: Abdomen is soft.      Tenderness: There is no abdominal tenderness. There is no guarding.     Musculoskeletal:         General: Normal range of motion.      Cervical back: Normal range of motion.     Skin:     General: Skin is warm and dry.     Neurological:      General: No focal deficit present.      Mental Status: She is alert and oriented to person, place, and time. Mental status is at  baseline.     Psychiatric:         Mood and Affect: Mood normal.         Behavior: Behavior normal.         Thought Content: Thought content normal.         Judgment: Judgment normal.         Lab Results: I have reviewed the following results:  Lab Results   Component Value Date    WBC 15.93 (H) 06/10/2025    HGB 9.6 (L) 06/10/2025    HCT 30.3 (L) 06/10/2025    MCV 86 06/10/2025     (L) 06/10/2025

## 2025-06-11 NOTE — DISCHARGE SUMMARY
Discharge Summary - OB/GYN   Name: Sultana Dolan 25 y.o. female I MRN: [id number]  Unit/Bed#: -01 I Date of Admission: 2025   Date of Service: 2025 I Hospital Day: 2    ADMISSION  Patient of: [facility] [place]  Admission Date: 2025   Admitting Attending: Dr. KACIE Rodriguez MD  Admitting Diagnoses: Problem List[1]    DELIVERY  Delivery Method: Vaginal, Spontaneous   Delivery Date and Time: 2025 6:29 PM  Delivery Attending: KACIE Rodriguez    DISCHARGE  Discharge Date: 2025  Discharge Attending: Dr. Jevon Gonzalez MD  Discharge Diagnosis:   Same, Delivered    Clinical course: Admission to Delivery  Sultana Dolan is a 25 y.o.  who was admitted at 39w3d for labor.    Pt was in spontaneous labor and managed expectantly.    For pain control in labor, pt received epidural analgesia.  She progressed to complete and began pushing.    Delivery  Route of Delivery: Vaginal, Spontaneous  Anesthesia: Epidural,   QBL: Non-Surgical QBL (mL): 64        Delivery: Vaginal, Spontaneous at 2025 6:29 PM  Laceration: Perineal: 2° Repaired? Yes    Baby's Weight: 3430 g (7 lb 9 oz); 121    Apgar scores: 7  and 9  at 1 and 5 minutes, respectively    Clinical Course: Post-Delivery:  The post delivery course was unremarkable.    On the day of discharge, the patient was ambulating, voiding spontaneously, tolerating oral intake, and hemodynamically stable. She was able to reasonably perform all ADLs. She had appropriate bowel function. Pain was well-controlled. She was discharged home on postpartum/postop day #2 without complications. Patient was instructed to follow up with her OB as an outpatient and was given appropriate warnings to call her provider with problems or concerns.    Pertinent lab findings included:   Blood type O+.     Last three Hgb values:  Lab Results   Component Value Date    HGB 9.6 (L) 06/10/2025    HGB 11.6 2025    HGB 10.4 (L) 2025         Problem-specific follow-up plans included the following:  Assessment & Plan   (spontaneous vaginal delivery)  - Progressing appropriately postpartum. Continue routine postpartum care. Stable for discharge to home.   Abnormal glucose affecting pregnancy  Thyroid disease affecting pregnancy  - managed by PCP  - Continue levothyroxine 150 mcg daily  Anemia affecting pregnancy in third trimester  - Hgb 11.6 > 9.6 postpartum  - will initiate ferrous sulfate daily along with vitamin C  Bipolar 1 disorder (HCC)  - stable, no meds  - counselor in [place]  Enlarged pituitary gland (HCC)        Discharge med list:       Medication List      START taking these medications     acetaminophen 325 mg tablet; Commonly known as: TYLENOL; Take 2 tablets   (650 mg total) by mouth every 4 (four) hours as needed for mild pain   ascorbic acid 500 MG tablet; Commonly known as: VITAMIN C; Take 1 tablet   (500 mg total) by mouth daily; Start taking on: 2025   docusate sodium 100 mg capsule; Commonly known as: COLACE; Take 1   capsule (100 mg total) by mouth 2 (two) times a day as needed for   constipation   ibuprofen 200 mg tablet; Commonly known as: MOTRIN; Take 3 tablets (600   mg total) by mouth every 6 (six) hours as needed for mild pain   witch hazel-glycerin topical pad; Commonly known as: TUCKS; Apply 1 Pad   topically every 4 (four) hours as needed for irritation     CONTINUE taking these medications     ferrous sulfate 325 (65 Fe) mg tablet   folic acid 400 mcg tablet; Commonly known as: FOLVITE   levothyroxine 150 mcg tablet; TAKE 1 TABLET BY MOUTH EVERY MORNING   PRENATAL 1 PO       Condition at discharge:   good     Disposition:   Home    Planned Readmission:   No    Discharge Statement:  I have spent a total time of 10 minutes in caring for this patient on the day of the visit/encounter.        [1]   Patient Active Problem List  Diagnosis    Acquired hypothyroidism    Constipation, acute    Depression     Enlarged pituitary gland (HCC)    Myopia of both eyes    Thyroid disorder    Bipolar 1 disorder (HCC)    Celiac disease    Hypothyroidism    Thyroid disease affecting pregnancy    Abnormal glucose affecting pregnancy    Anemia affecting pregnancy in third trimester    Macrosomia affecting management of mother in third trimester     (spontaneous vaginal delivery)

## 2025-06-11 NOTE — ASSESSMENT & PLAN NOTE
- Progressing appropriately postpartum. Continue routine postpartum care. Stable for discharge to home.

## 2025-06-12 ENCOUNTER — TELEPHONE (OUTPATIENT)
Dept: OBGYN CLINIC | Facility: CLINIC | Age: 25
End: 2025-06-12

## 2025-06-17 ENCOUNTER — OFFICE VISIT (OUTPATIENT)
Age: 25
End: 2025-06-17

## 2025-06-17 NOTE — PROGRESS NOTES
"INITIAL BREAST FEEDING EVALUATION    Informant/Relationship: Sultana (mom/self) and Mom's sister     Discussion of General Lactation Issues: Baby Ron was latching only to Sultana's nipples and this was causing blisters. Sultana has been pumping and bottle feeding since Friday. She has been applying lanolin the cream to them. Pumping hurts initially but then feels fine. She feels she is also getting some clogs - collecting globs from the left breast and \"massaging\" hardness on the left.     Infant, Ron) is 8 days old today.        History:  Fertility Problem:no  Breast changes:yes - enlargement, tenderness   : yes - long process, went in at 8 pm and was sent home, then back 2 am after consistent/every 5 minute contractions. Baby was born at 6:30 pm, Mom pushed for 40 minutes.   Full term:yes - 39 and 3    labor:no  First nursing/attempt < 1 hour after birth:yes - this was a little painful, but got progressively worse. More difficult on the left than on the right    Skin to skin following delivery:yes - immediately   Breast changes after delivery:yes - collecting colostrum antenatally, came in DOL 2   Rooming in (infant in room with mother with exception of procedures, eg. Circumcision: went to [facility] at night 1 x so Mom could rest and for testing   Blood sugar issues:no  NICU stay:no  Jaundice:no  Phototherapy:no  Supplement given: (list supplement and method used as well as reason(s):no    Past Medical History[1]    Current Medications[2]    Allergies   Allergen Reactions    Gluten Meal - Food Allergy GI Intolerance       Social History     Substance and Sexual Activity   Drug Use Never       Social History     Interval Breastfeeding History:    Frequency of breast feeding: none directly since Friday   Does mother feel breastfeeding is effective: Yes - bottle feedings are taking longer   Does infant appear satisfied after nursing:Yes  Stooling pattern normal: Yes  Urinating frequently:Yes  Using " shield or shells: No    Alternative/Artificial Feedings:   Bottle: Yes, helping to pace   Cup: No  Syringe/Finger: No           Formula Type: no                     Amount: n/a            Breast Milk:                      Amount: 2-3 oz             Frequency Q 3 Hr between feedings  Elimination Problems: No      Equipment:  Nipple Shield             Type: no             Size: n/a             Frequency of Use: not yet using   Pump            Type: Medela manual, [place] Go             Frequency of Use: every 2-3 hours     Equipment Problems: no    Mom:  Breast: Normal, heavy, rounded, red area to the right outer quadrant.   Nipple Assessment in General: Open wound/scabbing noted on the left nipple. Healing scabs on the right. Both are sore   Mother's Awareness of Feeding Cues                 Recognizes: Yes                  Verbalizes: Yes  Support System: really good support   History of Breastfeeding: first time breastfeeding   Changes/Stressors/Violence: nipple pain and damage, shallow latch   Concerns/Goals: Sultana desires to continue EBF and do less bottles.     Problems with Mom: damaged nipples     Physical Exam  Constitutional:       Appearance: Normal appearance.   HENT:      Head: Normocephalic.     Cardiovascular:      Rate and Rhythm: Normal rate.   Pulmonary:      Effort: Pulmonary effort is normal.     Musculoskeletal:         General: Normal range of motion.      Cervical back: Normal range of motion.     Neurological:      General: No focal deficit present.      Mental Status: She is alert and oriented to person, place, and time.     Skin:     General: Skin is warm.      Capillary Refill: Capillary refill takes less than 2 seconds.     Psychiatric:         Mood and Affect: Mood normal.         Behavior: Behavior normal.         Thought Content: Thought content normal.         Judgment: Judgment normal.       Infant:  Behaviors: Alert  Color: Pink  Birth weight: 3430 g   Current weight: 3320 g  "    Problems with infant: v-shaped tongue, compression when sucking      General Appearance:  Alert, active, no distress                            Head:  Normocephalic, AFOF, sutures opposed                            Eyes:   Conjunctiva clear, no drainage                            Ears:   Normally placed, no anomolies                           Nose:   Septum intact, no drainage or erythema                          Mouth:  No lesions. Tongue tip is at her palate at rest. Tongue tilts on its side when lateralizing. Stays at the floor of the mouth and body curls over the tip when extending. Chomping motion on gloved finger, maintains contact when gentle pressure placed on mandible. Notch in tongue noted with any movement. Frenulum is connected close to lower alveolar ridge and less than 1 cm from tongue tip. Created a slight \"speed bump\" under the tongue.                    Neck:  Supple, symmetrical, trachea midline                Respiratory:  No grunting, flaring, retractions, breath sounds clear and equal           Cardiovascular:  Regular rate and rhythm. No murmur. Adequate perfusion/capillary refill. Femoral pulse present                  Abdomen:    Soft, non-tender, no masses, bowel sounds present, no HSM            Genitourinary:  Normal female genitalia, anus patent                         Spine:   No abnormalities noted       Musculoskeletal:   Full range of motion         Skin/Hair/Nails:   Skin warm, dry, and intact, no rashes or abnormal dyspigmentation or lesions               Neurologic:   No abnormal movement, tone appropriate for gestational age    Many Latch:  Efficiency:               Lips Flanged: Yes              Depth of latch: wide - manual adjustments needed               Audible Swallow: Yes              Visible Milk: Yes              Wide Open/ Asymmetrical: Yes              Suck Swallow Cycle: Breathing: yes, Coordinated: yes  Nipple Assessment after latch: pinched, macerated   Latch " "Problems: Sultana is reporting a \"stinging\" sensation with each latch - she is able to get to a tolerable level with some adjustments. Rolled towel placed under the breast. We focused mainly on the right, attempting latching on the the left but scab peeled leaving an open wound on the nipple face.     Position:  Infant's Ergonomics/Body               Body Alignment: Yes               Head Supported: Yes               Close to Mom's body/ Lifted/ Supported: Yes               Mom's Ergonomics/Body: Yes                           Supported: Yes                           Sitting Back: Yes                           Brings Baby to her breast: Yes  Positioning Problems: None, reviewed BN hold and Sultana returns this demonstration well.       Education:  Reviewed Latch: importance of deep latch without pain.   Reviewed Positioning for Dyad: proper alignment and head angle when positioning at the breast   Reviewed Frequency/Supply & Demand: offer the breast at each feeding, pump if baby is not latching and effective transferring milk.   Reviewed Infant:Cues and varied States of Awareness: watch for hunger cues, feed on demand. If baby seems satisfied at the breast (calm, relaxed sleeping, breasts are softer) no need to pump or supplement   Reviewed Infant Elimination: goal of 6+ wets and 2-3 stools per day   Reviewed Alternative/Artificial Feedings: paced bottle feeding technique demonstrated  Reviewed Mom/Breast care: gentle handling of the breast at all times, discussed lymphatic drainage and reverse pressure softening, as well as tips for healing sore nipples.  Engorgement relief measures.   Reviewed Equipment: Hand pump and electric pump general guidance, Discussed proper flange fit, how to measure        Plan:      Reassurance provided that baby is growing well at this time. Cont with positioning adjustments and watch for signs of effective feeding. Pump if wanting to replace feeding at the breast with bottle feeding or " if latching becomes painful - plan to pump the left breast to comfort if baby is mainly feeding on the right. Cold compresses, ibuprofen, and supportive bra recommended to relieve engorgement.   Gentle handling of the breast at all times to preserve integrity.  Contact [facility] for breastfeeding support as needed or ongoing concerns with latching comfort and milk transfer. Follow up scheduled in 2 weeks.     I have spent 90 minutes with Patient and family today in which greater than 50% of this time was spent in counseling/coordination of care regarding Patient and family education.                [1]   Past Medical History:  Diagnosis Date    Depression 2016    therapist q 2 wk    Dermagraphy     Disease of thyroid gland     Hypothyroidism 2015    Levothyroxine 150 mcg - increased dosage 11/25/2024    Migraine     past history    S/P ACL reconstruction 12/17/2019    Formatting of this note might be different from the original.  Sees [facility]- last seen 05/21/2020--progressing, getting PT---return in 2 months.   7/20 complete formal PT. Working out at gym 3 days/week      Varicella     pt had vaccine in childhood   [2]   Current Outpatient Medications:     acetaminophen (TYLENOL) 325 mg tablet, Take 2 tablets (650 mg total) by mouth every 4 (four) hours as needed for mild pain, Disp: , Rfl:     ascorbic acid (VITAMIN C) 500 MG tablet, Take 1 tablet (500 mg total) by mouth daily, Disp: , Rfl:     docusate sodium (COLACE) 100 mg capsule, Take 1 capsule (100 mg total) by mouth 2 (two) times a day as needed for constipation, Disp: , Rfl:     ferrous sulfate 325 (65 Fe) mg tablet, Take 325 mg by mouth daily with breakfast, Disp: , Rfl:     folic acid (FOLVITE) 400 mcg tablet, Take 400 mcg by mouth in the morning., Disp: , Rfl:     ibuprofen (MOTRIN) 200 mg tablet, Take 3 tablets (600 mg total) by mouth every 6 (six) hours as needed for mild pain, Disp: , Rfl:     levothyroxine 150 mcg tablet, TAKE 1 TABLET BY MOUTH  EVERY MORNING, Disp: 90 tablet, Rfl: 1    Prenatal MV-Min-Fe Fum-FA-DHA (PRENATAL 1 PO), Take by mouth, Disp: , Rfl:     witch hazel-glycerin (TUCKS) topical pad, Apply 1 Pad topically every 4 (four) hours as needed for irritation, Disp: , Rfl:

## 2025-06-17 NOTE — PATIENT INSTRUCTIONS
-Continue to feed baby River on demand, watch for signs of effective feeding. You should feel strong, comfortable tugging, hear swallows and feel your breasts become softer after baby is finished.  -Wear a supportive, but not tight, bra. Sit comfortably reclined when nursing or pumping, and throughout the day when possible.   -Okay to pump as needed for replacing nursing sessions or for uncomfortable engorgement, utilize flange fit and settings/suction that are comfortable for you, pumping should not be painful! Goal to soften the breast to comfort as needed, if baby is also feeding well around the clock try to limit pumping to avoid excess milk removal.  -If pumping to replace a feeding session, collect no more than what is needed for baby and what allows your breasts to be comfortable.   -Give the left side a break as much as you need - continue with pump to protect supply on that side. If Ron is getting everything she needs on one breast, pump for comfort and to collect only small volumes   -Cold compresses, like a bag of peas applied over a thin blanket or towel to the breast, is recommended for breast comfort and decreasing inflammation in the breast. You can also take ibuprofen as needed.   -Healing techniques for nipples : nipple cream of choice and airflow, or apply cream and cover with a piece of wax or parchment paper over top in between feedings.   -Contact OB for fever, chills, and breast symptoms that do no resolve within 24-48 hours of supportive care.   - Storing and Thawing Breast Milk  [facility] Breastfeeding Support ([employer])    -Follow up in two weeks for ongoing feeding support. Please call with questions or concerns you have in the meantime.

## 2025-06-18 ENCOUNTER — TELEPHONE (OUTPATIENT)
Dept: OBGYN CLINIC | Facility: CLINIC | Age: 25
End: 2025-06-18

## 2025-06-19 LAB — PLACENTA IN STORAGE: NORMAL

## 2025-06-28 NOTE — PROGRESS NOTES
Routine Post Partum Visit  [facility] - [address], [address], [place] [address]    Assessment/Plan:  Sultana is a 25 y.o. year old  who presents for postpartum visit.    Routine Postpartum Care  Normal postpartum exam  Contraception: condoms  Depression Screen: PPD screen score: 7; negative  Feeding:  She is breast feeding exclusively.  Physical therapy referral: routine  Cervical cancer screening Up to Date, next due 1-2 years  Follow up in: 3 months for wellness visit, or as needed.    Additional Problems:  Assessment & Plan  Postpartum care and examination  - 3 weeks s/p , 2nd degree laceration,  - doing well    Orders:    Ambulatory Referral to Physical Therapy; Future    Postpartum anemia  Taking po iron some days.  Gave order for CBC in next couple weeks.    Orders:    CBC; Future      Next visit: 3 months Wellness    Subjective:     CC: Postpartum visit    Sultana Dolan is a 25 y.o. y.o. female  who presents for a postpartum visit.     She is 3 weeks postpartum following a spontaneous vaginal delivery on 2025 at 39 weeks.    Outcome: spontaneous vaginal delivery, 2nd degree  Anesthesia: epidural. Postpartum course has been uncomplicated. Baby's course has been uncomplicated. Baby is feeding by She is breast feeding exclusively..     Bleeding thin lochia. Bowel function is mostly normal. Bladder function is normal. Patient is not sexually active since delivery. Contraception method is condoms. Postpartum depression screening: negative.    The following portions of the patient's history were reviewed and updated as appropriate: allergies, current medications, past family history, past medical history, obstetric history, gynecologic history, past social history, past surgical history and problem list.      Objective:  LMP 2024 (Exact Date)   Pregravid Weight/BMI: 65.8 kg (145 lb) (BMI 26.51)  Current Weight:     Total Weight Gain: 18.6 kg (41 lb)      General: Well appearing, no distress.  Mood and affect: Appropriate.  Abdomen: Soft, nontender  Incision: healed  Pelvic:   Vulva: normal  Vagina: normal, light lochia  Cervix: closed, no bleeding  Uterus: non-tender, 6-8 week size  Adnexa: no masses, no tenderness

## 2025-06-29 NOTE — PROGRESS NOTES
I have reviewed the notes, assessments, and/or procedures performed by Karlie Jauregui RN, IBCLC, I concur with her/his documentation of Sultana Obando MD 06/29/25

## 2025-07-01 ENCOUNTER — POSTPARTUM VISIT (OUTPATIENT)
Dept: OBGYN CLINIC | Facility: CLINIC | Age: 25
End: 2025-07-01

## 2025-07-01 VITALS — DIASTOLIC BLOOD PRESSURE: 60 MMHG | SYSTOLIC BLOOD PRESSURE: 110 MMHG | BODY MASS INDEX: 34.02 KG/M2 | HEIGHT: 62 IN

## 2025-07-01 PROCEDURE — 99024 POSTOP FOLLOW-UP VISIT: CPT | Performed by: OBSTETRICS & GYNECOLOGY

## 2025-07-08 ENCOUNTER — OFFICE VISIT (OUTPATIENT)
Age: 25
End: 2025-07-08
Payer: COMMERCIAL

## 2025-07-08 PROCEDURE — 99404 PREV MED CNSL INDIV APPRX 60: CPT | Performed by: PEDIATRICS

## 2025-07-08 NOTE — PATIENT INSTRUCTIONS
"-Continue with \"on demand\" feedings. Practice different positioning, allowing Ron to reach \"up\" to latch.   -No need to pump if she is feeding well at the breast on demand and your breasts are comfortable (even if feeling more \"full\").  -Pump only as needed to replaced missed breastfeeding sessions or if breasts are uncomfortable, and only pump to the point of comfort.   --Cold compresses, like a bag of peas applied over a thin blanket or towel to the breast, is recommended for breast comfort and decreasing inflammation in the breast. You can also take ibuprofen as needed if safe for you to take.   -Follow up with breastfeeding medicine for Ron as scheduled.   "

## 2025-07-08 NOTE — PROGRESS NOTES
BREAST FEEDING FOLLOW UP VISIT    Informant/Relationship: Sultana (mom/self)     Discussion of General Lactation Issues: Sultana reports that there is pain with almost every latch. Her nipples have healed, but yesterday her left nipple started hurting again.     Infant is 1 month old today.    Interval Breastfeeding History:    Frequency of breast feeding: every 3 hours, some cluster feeding at times.   Does mother feel breastfeeding is effective: Only sometimes   Does infant appear satisfied after nursing:If no, explain: not always   Stooling pattern normal:Yes  Urinating frequently:Yes  Using shield or shells:No    Alternative/Artificial Feedings:   Bottle: Yes, Nola Luis and Dr. Thomas's, pacing the feedings   Cup: No  Syringe/Finger: No           Formula Type: no                     Amount: n/a            Breast Milk:                      Amount: 3 oz             Frequency Q 1 x before bed   Elimination Problems: No      Equipment:  Nipple Shield             Type: no             Size: n/a             Frequency of Use: n/a  Pump            Type: Medela Manual, [place] go            Frequency of Use: she is pumping if River is not going to the second breast.     Storing about 8 oz per day.     Equipment Problems: no      Mom:  Breast: Mild Engorgement/Swelling, tenderness reported on the left outer quadrant   Nipple Assessment in General: Normal: elongated/eraser, no discoloration and no damage noted - healing cracks observed on the left. Tender   Mother's Awareness of Feeding Cues                 Recognizes: Yes                  Verbalizes: Yes  Support System: good support   History of Breastfeeding: first time breastfeeding   Changes/Stressors/Violence: damage has improved, pain is persisting. She is very clicky when drinking the breat or to bottle.   Concerns/Goals: Sultana would like to continue to provide breast milk to Ron, continue to work through doing so without pain.     Problems with Mom: painful  "blanco     Physical Exam  Constitutional:       Appearance: Normal appearance.   HENT:      Head: Normocephalic.   Pulmonary:      Effort: Pulmonary effort is normal.     Musculoskeletal:         General: Normal range of motion.      Cervical back: Normal range of motion.     Neurological:      General: No focal deficit present.      Mental Status: She is alert and oriented to person, place, and time.     Skin:     General: Skin is warm.      Capillary Refill: Capillary refill takes less than 2 seconds.     Psychiatric:         Mood and Affect: Mood normal.         Behavior: Behavior normal.         Thought Content: Thought content normal.         Judgment: Judgment normal.       Infant:  Behaviors: Alert  Color: Pink  Birth weight: 3430 g  Current weight: 3925 g     Problems with infant: clicking on breast and bottle       General Appearance:  Alert, active, no distress                            Head:  Normocephalic, AFOF, sutures opposed                            Eyes:   Conjunctiva clear, no drainage                            Ears:   Normally placed, no anomolies                           Nose:   Septum intact, no drainage or erythema                          Mouth:  No lesions. Tongue tip is at her palate at rest. Tongue tilts on its side when lateralizing. Stays at the floor of the mouth and body curls over the tip when extending. Chomping motion on gloved finger, maintains contact when gentle pressure placed on mandible. Notch in tongue noted with any movement. Frenulum is connected close to lower alveolar ridge and less than 1 cm from tongue tip. Created a slight \"speed bump\" under the tongue.                    Neck:  Supple, symmetrical, trachea midline                Respiratory:  No grunting, flaring, retractions, breath sounds clear and equal           Cardiovascular:  Regular rate and rhythm. No murmur. Adequate perfusion/capillary refill. Femoral pulse present                  Abdomen:    Soft, " non-tender, no masses, bowel sounds present, no HSM            Genitourinary:  Normal female genitalia, anus patent                         Spine:   No abnormalities noted       Musculoskeletal:   Full range of motion         Skin/Hair/Nails:   Skin warm, dry, and intact, no rashes or abnormal dyspigmentation or lesions               Neurologic:   No abnormal movement, tone appropriate for gestational age    [place] Latch:  Efficiency:               Lips Flanged: Yes              Depth of latch: wide               Audible Swallow: clicking throughout the majority of the feeding               Visible Milk: Yes              Wide Open/ Asymmetrical: Yes              Suck Swallow Cycle: Breathing: yes, Coordinated: yes  Nipple Assessment after latch: compressed appearance   Latch Problems: Painful latch reported initially, improved when Ron was able to gape better and hugged to the breast. Sultana does report an entirely pain free attachment for the majority of the session.  Clicking is noted during 95% of the session, she is drinking actively.     She transfers 105 g in about 10 minutes.     Position:  Infant's Ergonomics/Body               Body Alignment: Yes               Head Supported: Yes               Close to Mom's body/ Lifted/ Supported: Yes               Mom's Ergonomics/Body: Yes                           Supported: Yes                           Sitting Back: Yes                           Brings Baby to her breast: Yes  Positioning Problems: None         Education:  Reviewed Latch: importance of deep latch without pain.   Reviewed Positioning for Dyad: proper alignment and head angle when positioning at the breast   Reviewed Frequency/Supply & Demand: offer the breast at each feeding, pump if baby is not latching and effective transferring milk.   Reviewed Infant:Cues and varied States of Awareness: watch for hunger cues, feed on demand. If baby seems satisfied at the breast (calm, relaxed sleeping, breasts  "are softer) no need to pump or supplement   Reviewed Infant Elimination: goal of 6+ wets and 2-3 stools per day   Reviewed Alternative/Artificial Feedings: paced bottle feeding technique demonstrated  Reviewed Mom/Breast care: gentle handling of the breast at all times, as well as tips for healing sore nipples.    Reviewed Equipment: Hand pump and electric pump general guidance, Discussed proper flange fit, how to measure        Plan:      Reassurance provided that baby is growing well at this time. Cont with positioning adjustments and watch for signs of effective feeding. Pump only if wanting to replace feeding at the breast with bottle feeding or if latching becomes painful. Save only 1-3 oz of \"bonus\" milk each day. Gentle handling of the breast at all times to preserve integrity.  Contact [facility] for breastfeeding support as needed or ongoing concerns with latching comfort and milk transfer.     I have spent 60 minutes with Patient and family today in which greater than 50% of this time was spent in counseling/coordination of care regarding Patient and family education.                                                                             "

## 2025-07-09 ENCOUNTER — EVALUATION (OUTPATIENT)
Dept: PHYSICAL THERAPY | Facility: CLINIC | Age: 25
End: 2025-07-09
Attending: OBSTETRICS & GYNECOLOGY
Payer: COMMERCIAL

## 2025-07-09 DIAGNOSIS — R53.1 WEAKNESS: ICD-10-CM

## 2025-07-09 DIAGNOSIS — M62.08 DIASTASIS RECTI: Primary | ICD-10-CM

## 2025-07-09 PROCEDURE — 97530 THERAPEUTIC ACTIVITIES: CPT | Performed by: PHYSICAL THERAPIST

## 2025-07-09 PROCEDURE — 97161 PT EVAL LOW COMPLEX 20 MIN: CPT | Performed by: PHYSICAL THERAPIST

## 2025-07-09 PROCEDURE — 97140 MANUAL THERAPY 1/> REGIONS: CPT | Performed by: PHYSICAL THERAPIST

## 2025-07-09 PROCEDURE — 97110 THERAPEUTIC EXERCISES: CPT | Performed by: PHYSICAL THERAPIST

## 2025-07-09 NOTE — PROGRESS NOTES
PT Evaluation     Today's date: 2025  Patient name: Sultana Dolan  : 2000  MRN: [id number]  Referring provider: Tarsha Hernandez MD  Dx:   Encounter Diagnosis     ICD-10-CM    1. Diastasis recti  M62.08       2. Postpartum care and examination  Z39.2 Ambulatory Referral to Physical Therapy      3. Weakness  R53.1                      Assessment  Impairments: abnormal muscle tone, impaired physical strength, lacks appropriate home exercise program, poor posture , poor body mechanics and endurance  Symptom irritability: low    Assessment details: PFM assessment deferred until 6 weeks PP. Patient presents with DR and significant  weakness of the hips. Patient would benefit from PT services at this time to focus on core/PFM strengthening, uptraining and coordination for goals as noted.    The plan of care was discussed and included education regarding pelvic floor anatomy, explanation of exam technique, explanation of exam findings and discussion of treatment plan as well as the importance of patient compliance and adherence to physical therapy visits.     Patient provided verbal and written consent for internal pelvic floor muscle assessment prior to examination.      Goals  STGs to be met by 8 weeks:  * Implements relaxation strategies on a daily basis.  * Patient will report 50% reduction in discomfort with either palpation or intimacy.  * Patient presents with good understanding of pelvic floor protective strategies to reduce intra-abdominal pressure against pelvic organs.  * Demonstrates correct isolation and relaxation of pelvic floor to palpation without overflow from global stabilizers.    LTGs to be met by d/c:  * Patient will use pelvic floor muscles correctly during functional ADLs such as coughing, sneezing, lifting and exercise activities to avoid excessive IAP and PFM strain.   * Patient will present with improve diastasis recti CT integrity as well as width to less than 2 finger(s).    * Patient will be compliant with comprehensive home exercise program for self management of condition.      Plan  Patient would benefit from: skilled physical therapy  Planned modality interventions: biofeedback    Planned therapy interventions: abdominal trunk stabilization, manual therapy, massage, nerve gliding, neuromuscular re-education, patient/caregiver education, postural training, self care, strengthening, stretching, therapeutic activities, therapeutic exercise, therapeutic training, home exercise program, graded exercise, flexibility, compression, coordination, breathing training and body mechanics training    Frequency: 1x week  Duration in weeks: 12  Plan of Care beginning date: 2025  Plan of Care expiration date: 10/9/2025  Treatment plan discussed with: patient      SL ELENITA SF PT WOMEN'S HEALTH POSTPARTUM SUBJECTIVE EVAL:   History Of Present Illness:  Sultana is a  who presents 4 weeks  s/p Vaginal delivery of baby girlRon. Baby was born at 40 weeks gestation and weighed 7lb 9oz. Delivery was long. Started a  night,  Edidural around 9:30a, born at 6:30p the next day. Pushed for 45 minutes. Second degree tear. Itching and tenderness at incision. Feels ready to start getting active.  Labor position includes the following:  Supine lithotomy   epidural administered  Perineal tearing stgstrstastdstest:st st1st Baby feeding:  Breast only   Lactation consultant very helpful  Patient plans to return to work: Yes    Vocation:  Teacher  Planned RTW date:  September  Other children: No    Exercise/Fitness current activity and goals:  Walking  Postpartum depression screen / [place]  depression scales score (above 10 OB/GYN to be notified of score):  4  Lochia:  Light  Sexual activity:  Has not resumed intercourse  Current urine leakage with:  Walking to the bathroom urgency  Daily hydration:   ounces  Bowel frequency/regularity:  Every 2 days  [place] type:  3   always on the constapated  side  Treatments:     None      Pain location:  Incisional  Patient Goals:     Patient goals for therapy:  Improved comfort, increased strength and independence with childcare    Other patient goals:  Wants to get back into lifting heavy weights.      Objective     Static Posture     Comments   2-2-2, med depth                     Daily Treatment Diary     Precautions: Problem List[1]    CO-MORBIDITIES:  TO MD On:   FOTO Completed On:     POC Expires Reeval for Medicare to be completed  Unit Limit Auth Expiration Date PT/OT/STVisit Limit   10/9 By visit NA NA  60    Completed on visit                    Auth Status DATE         Approved Visit # 1         Remaining 59        MANUAL THERAPY KK        Abdominal manuals         PFM assessment (at 6 weeks)                                             THERAPEUTIC EXERCISE HEP         Core training TA holds 10''x10; seated core contractions 10x3         Hip strengthening                                                                                                                                            NEUROMUSCULAR REEDUCATION                                                                                                                                                       THERAPEUTIC ACTIVITY          Edu POC, women's health, reasoning, safe exericse                                       GAIT TRAINING                                                  MODALITIES                                              [1]   Patient Active Problem List  Diagnosis    Acquired hypothyroidism    Constipation, acute    Depression    Enlarged pituitary gland (HCC)    Myopia of both eyes    Thyroid disorder    Bipolar 1 disorder (HCC)    Celiac disease    Hypothyroidism    Thyroid disease affecting pregnancy    Postpartum anemia     (spontaneous vaginal delivery)

## 2025-07-09 NOTE — PROGRESS NOTES
I have reviewed the notes, assessments, and/or procedures performed by Karlie Jauregui RN, IBCLC, I concur with her/his documentation of Sultana Obando MD 07/08/25

## 2025-07-21 ENCOUNTER — OFFICE VISIT (OUTPATIENT)
Dept: PHYSICAL THERAPY | Facility: CLINIC | Age: 25
End: 2025-07-21
Attending: OBSTETRICS & GYNECOLOGY
Payer: COMMERCIAL

## 2025-07-21 DIAGNOSIS — R53.1 WEAKNESS: ICD-10-CM

## 2025-07-21 PROCEDURE — 97110 THERAPEUTIC EXERCISES: CPT | Performed by: PHYSICAL THERAPIST

## 2025-07-21 PROCEDURE — 97530 THERAPEUTIC ACTIVITIES: CPT | Performed by: PHYSICAL THERAPIST

## 2025-07-21 PROCEDURE — 97140 MANUAL THERAPY 1/> REGIONS: CPT | Performed by: PHYSICAL THERAPIST

## 2025-07-21 NOTE — PROGRESS NOTES
Daily Note     Today's date: 2025  Patient name: Sultana Dolan  : 2000  MRN: [id number]  Referring provider: Tarsha Hernandez MD  Dx:   Encounter Diagnosis     ICD-10-CM    1. Postpartum care and examination  Z39.2       2. Weakness  R53.1                      Subjective: 6 weeks PP today. Sore and tight in the upper back with nursing. No intercourse. Fearful of attempting intercourse.      Objective: See treatment diary below      Assessment: Tolerated treatment well. Patient would benefit from continued PT. Patient with decreased pain post abdominal manuals. Initial tight and painful PFM manuals, decreased c c/r.   depth improved.      Plan: Continue per plan of care.            Daily Treatment Diary     Precautions: [Problem List]    [Problem List]  Patient Active Problem List  Diagnosis    Acquired hypothyroidism    Constipation, acute    Depression    Enlarged pituitary gland (HCC)    Myopia of both eyes    Thyroid disorder    Bipolar 1 disorder (HCC)    Celiac disease    Hypothyroidism    Thyroid disease affecting pregnancy    Postpartum anemia     (spontaneous vaginal delivery)       CO-MORBIDITIES:  TO MD On:   FOTO Completed On:     POC Expires Reeval for Medicare to be completed  Unit Limit Auth Expiration Date PT/OT/STVisit Limit   10/9 By visit NA NA  60    Completed on visit                Access Code: [email or web address]  URL: [email or web address]  Date: 2025  Prepared by: Lilia Mijares    Exercises  - Supine Figure 4 Piriformis Stretch  - 1 x daily - 3 sets - 30 hold  - Seated Piriformis Stretch with Trunk Bend  - 1 x daily - 3 sets - 30 hold  - Supine Butterfly Groin Stretch  - 1 x daily - 3 sets - 30 hold  - Kneeling Adductor Stretch with Hip External Rotation  - 1 x daily - 3 sets  - Standing Hip Extension with Resistance at Ankles and Counter Support  - 1 x daily - 3 sets - 10 reps - 3 hold  - Standing Hip Abduction with Resistance at Ankles and  Counter Support  - 1 x daily - 3 sets - 10 reps - 3 hold  - Standing Hip Flexor Stretch  - 1 x daily - 3 sets - 30 hold      Auth Status DATE 7/9 7/21       Approved Visit # 1         Remaining 59        MANUAL THERAPY KK        Abdominal manuals  KK       PFM assessment (at 6 weeks) + manuals   KK                                           THERAPEUTIC EXERCISE HEP         Core training TA holds 10''x10; seated core contractions 10x3         Hip strengthening          Standing hip ext   10x3 ea       Standing hip abd   10x3 ea       Standing hip flexor stretch   3x30'' ea       Seated and supine piriformis stretch   3x30''ea       Butterfly and standing adductor stretch   3x30'' ea                                                                                       NEUROMUSCULAR REEDUCATION                                                                                                                                                       THERAPEUTIC ACTIVITY          Edu POC, women's health, reasoning, safe exericse  Self manuals in the shower, c/r; breastfeeding positioning                                     GAIT TRAINING                                                  MODALITIES

## 2025-07-21 NOTE — HOME EXERCISE EDUCATION
Program_ID:[id number]   Access Code: [email or web address]  URL: [email or web address]  Date: 07-  Prepared By: Lilia Mijares    Program Notes      Exercises      - Supine Figure 4 Piriformis Stretch - 1 x daily -  x weekly - 3 sets -  reps - 30 hold      - Seated Piriformis Stretch with Trunk Bend - 1 x daily -  x weekly - 3 sets -  reps - 30 hold      - Supine Butterfly Groin Stretch - 1 x daily -  x weekly - 3 sets -  reps - 30 hold      - Kneeling Adductor Stretch with Hip External Rotation - 1 x daily -  x weekly - 3 sets -  reps      - Standing Hip Extension with Resistance at Ankles and Counter Support - 1 x daily -  x weekly - 3 sets - 10 reps - 3 hold      - Standing Hip Abduction with Resistance at Ankles and Counter Support - 1 x daily -  x weekly - 3 sets - 10 reps - 3 hold      - Standing Hip Flexor Stretch - 1 x daily -  x weekly - 3 sets -  reps - 30 hold

## 2025-07-28 ENCOUNTER — OFFICE VISIT (OUTPATIENT)
Age: 25
End: 2025-07-28
Payer: COMMERCIAL

## 2025-07-28 VITALS — DIASTOLIC BLOOD PRESSURE: 74 MMHG | SYSTOLIC BLOOD PRESSURE: 116 MMHG

## 2025-07-28 PROCEDURE — 99205 OFFICE O/P NEW HI 60 MIN: CPT | Performed by: PEDIATRICS

## 2025-08-04 ENCOUNTER — OFFICE VISIT (OUTPATIENT)
Dept: PHYSICAL THERAPY | Facility: CLINIC | Age: 25
End: 2025-08-04
Attending: OBSTETRICS & GYNECOLOGY
Payer: COMMERCIAL

## 2025-08-04 DIAGNOSIS — M62.08 DIASTASIS RECTI: ICD-10-CM

## 2025-08-04 DIAGNOSIS — R53.1 WEAKNESS: ICD-10-CM

## 2025-08-04 PROCEDURE — 97110 THERAPEUTIC EXERCISES: CPT

## 2025-08-04 PROCEDURE — 97112 NEUROMUSCULAR REEDUCATION: CPT

## 2025-08-12 ENCOUNTER — OFFICE VISIT (OUTPATIENT)
Age: 25
End: 2025-08-12
Payer: COMMERCIAL

## 2025-08-13 ENCOUNTER — OFFICE VISIT (OUTPATIENT)
Dept: PHYSICAL THERAPY | Facility: CLINIC | Age: 25
End: 2025-08-13
Attending: OBSTETRICS & GYNECOLOGY
Payer: COMMERCIAL

## 2025-08-18 ENCOUNTER — NURSE TRIAGE (OUTPATIENT)
Age: 25
End: 2025-08-18

## 2025-08-18 ENCOUNTER — OFFICE VISIT (OUTPATIENT)
Dept: OBGYN CLINIC | Facility: CLINIC | Age: 25
End: 2025-08-18
Payer: COMMERCIAL

## 2025-08-18 VITALS
SYSTOLIC BLOOD PRESSURE: 110 MMHG | DIASTOLIC BLOOD PRESSURE: 60 MMHG | HEIGHT: 62 IN | WEIGHT: 169 LBS | BODY MASS INDEX: 31.1 KG/M2

## 2025-08-18 DIAGNOSIS — N89.8 VAGINAL DISCHARGE: ICD-10-CM

## 2025-08-18 DIAGNOSIS — L29.3 PERINEAL PRURITUS IN FEMALE: ICD-10-CM

## 2025-08-18 DIAGNOSIS — B37.31 VAGINAL YEAST INFECTION: Primary | ICD-10-CM

## 2025-08-18 PROCEDURE — 99213 OFFICE O/P EST LOW 20 MIN: CPT | Performed by: OBSTETRICS & GYNECOLOGY

## 2025-08-18 RX ORDER — FLUCONAZOLE 150 MG/1
150 TABLET ORAL ONCE
Qty: 1 TABLET | Refills: 0 | Status: SHIPPED | OUTPATIENT
Start: 2025-08-18 | End: 2025-08-18

## 2025-08-20 ENCOUNTER — OFFICE VISIT (OUTPATIENT)
Dept: PHYSICAL THERAPY | Facility: CLINIC | Age: 25
End: 2025-08-20
Attending: OBSTETRICS & GYNECOLOGY
Payer: COMMERCIAL

## 2025-08-20 DIAGNOSIS — R53.1 WEAKNESS: ICD-10-CM

## 2025-08-20 DIAGNOSIS — M62.08 DIASTASIS RECTI: ICD-10-CM

## 2025-08-20 PROCEDURE — 97110 THERAPEUTIC EXERCISES: CPT

## 2025-08-20 PROCEDURE — 97140 MANUAL THERAPY 1/> REGIONS: CPT

## 2025-08-20 PROCEDURE — 97112 NEUROMUSCULAR REEDUCATION: CPT

## 2025-08-23 ENCOUNTER — NURSE TRIAGE (OUTPATIENT)
Dept: OTHER | Facility: OTHER | Age: 25
End: 2025-08-23